# Patient Record
Sex: FEMALE | HISPANIC OR LATINO | Employment: UNEMPLOYED | URBAN - NONMETROPOLITAN AREA
[De-identification: names, ages, dates, MRNs, and addresses within clinical notes are randomized per-mention and may not be internally consistent; named-entity substitution may affect disease eponyms.]

---

## 2023-07-21 ENCOUNTER — APPOINTMENT (EMERGENCY)
Dept: RADIOLOGY | Facility: HOSPITAL | Age: 43
End: 2023-07-21
Payer: COMMERCIAL

## 2023-07-21 ENCOUNTER — APPOINTMENT (EMERGENCY)
Dept: CT IMAGING | Facility: HOSPITAL | Age: 43
End: 2023-07-21
Payer: COMMERCIAL

## 2023-07-21 ENCOUNTER — HOSPITAL ENCOUNTER (OUTPATIENT)
Facility: HOSPITAL | Age: 43
Setting detail: OBSERVATION
Discharge: HOME | End: 2023-07-22
Attending: EMERGENCY MEDICINE | Admitting: INTERNAL MEDICINE
Payer: COMMERCIAL

## 2023-07-21 DIAGNOSIS — R29.810 FACIAL DROOP: Primary | ICD-10-CM

## 2023-07-21 DIAGNOSIS — R07.9 CHEST PAIN: ICD-10-CM

## 2023-07-21 DIAGNOSIS — I10 HYPERTENSION, UNSPECIFIED TYPE: ICD-10-CM

## 2023-07-21 DIAGNOSIS — F41.1 GAD (GENERALIZED ANXIETY DISORDER): ICD-10-CM

## 2023-07-21 LAB
ALBUMIN SERPL BCP-MCNC: 4.3 G/DL (ref 3.5–5)
ALP SERPL-CCNC: 81 U/L (ref 34–104)
ALT SERPL W P-5'-P-CCNC: 16 U/L (ref 7–52)
ANION GAP SERPL CALCULATED.3IONS-SCNC: 9 MMOL/L
APTT PPP: 24 SECONDS (ref 23–37)
AST SERPL W P-5'-P-CCNC: 21 U/L (ref 13–39)
BASOPHILS # BLD AUTO: 0.03 THOUSANDS/ÂΜL (ref 0–0.1)
BASOPHILS NFR BLD AUTO: 0 % (ref 0–1)
BILIRUB SERPL-MCNC: 0.28 MG/DL (ref 0.2–1)
BUN SERPL-MCNC: 14 MG/DL (ref 5–25)
CALCIUM SERPL-MCNC: 9.3 MG/DL (ref 8.4–10.2)
CARDIAC TROPONIN I PNL SERPL HS: 2 NG/L
CHLORIDE SERPL-SCNC: 103 MMOL/L (ref 96–108)
CO2 SERPL-SCNC: 25 MMOL/L (ref 21–32)
CREAT SERPL-MCNC: 0.8 MG/DL (ref 0.6–1.3)
EOSINOPHIL # BLD AUTO: 0.22 THOUSAND/ÂΜL (ref 0–0.61)
EOSINOPHIL NFR BLD AUTO: 3 % (ref 0–6)
ERYTHROCYTE [DISTWIDTH] IN BLOOD BY AUTOMATED COUNT: 13.7 % (ref 11.6–15.1)
GFR SERPL CREATININE-BSD FRML MDRD: 91 ML/MIN/1.73SQ M
GLUCOSE SERPL-MCNC: 85 MG/DL (ref 65–140)
GLUCOSE SERPL-MCNC: 88 MG/DL (ref 65–140)
HCT VFR BLD AUTO: 35.1 % (ref 34.8–46.1)
HGB BLD-MCNC: 11 G/DL (ref 11.5–15.4)
IMM GRANULOCYTES # BLD AUTO: 0.01 THOUSAND/UL (ref 0–0.2)
IMM GRANULOCYTES NFR BLD AUTO: 0 % (ref 0–2)
INR PPP: 0.94 (ref 0.84–1.19)
LYMPHOCYTES # BLD AUTO: 3.05 THOUSANDS/ÂΜL (ref 0.6–4.47)
LYMPHOCYTES NFR BLD AUTO: 36 % (ref 14–44)
MCH RBC QN AUTO: 26.8 PG (ref 26.8–34.3)
MCHC RBC AUTO-ENTMCNC: 31.3 G/DL (ref 31.4–37.4)
MCV RBC AUTO: 85 FL (ref 82–98)
MONOCYTES # BLD AUTO: 0.51 THOUSAND/ÂΜL (ref 0.17–1.22)
MONOCYTES NFR BLD AUTO: 6 % (ref 4–12)
NEUTROPHILS # BLD AUTO: 4.57 THOUSANDS/ÂΜL (ref 1.85–7.62)
NEUTS SEG NFR BLD AUTO: 55 % (ref 43–75)
NRBC BLD AUTO-RTO: 0 /100 WBCS
PLATELET # BLD AUTO: 353 THOUSANDS/UL (ref 149–390)
PMV BLD AUTO: 10.4 FL (ref 8.9–12.7)
POTASSIUM SERPL-SCNC: 3.6 MMOL/L (ref 3.5–5.3)
PROT SERPL-MCNC: 7.7 G/DL (ref 6.4–8.4)
PROTHROMBIN TIME: 12.8 SECONDS (ref 11.6–14.5)
RBC # BLD AUTO: 4.11 MILLION/UL (ref 3.81–5.12)
SODIUM SERPL-SCNC: 137 MMOL/L (ref 135–147)
WBC # BLD AUTO: 8.39 THOUSAND/UL (ref 4.31–10.16)

## 2023-07-21 PROCEDURE — 99285 EMERGENCY DEPT VISIT HI MDM: CPT | Performed by: EMERGENCY MEDICINE

## 2023-07-21 PROCEDURE — 82948 REAGENT STRIP/BLOOD GLUCOSE: CPT

## 2023-07-21 PROCEDURE — 36415 COLL VENOUS BLD VENIPUNCTURE: CPT | Performed by: EMERGENCY MEDICINE

## 2023-07-21 PROCEDURE — 71275 CT ANGIOGRAPHY CHEST: CPT

## 2023-07-21 PROCEDURE — 71045 X-RAY EXAM CHEST 1 VIEW: CPT

## 2023-07-21 PROCEDURE — G1004 CDSM NDSC: HCPCS

## 2023-07-21 PROCEDURE — 0241U HB NFCT DS VIR RESP RNA 4 TRGT: CPT | Performed by: EMERGENCY MEDICINE

## 2023-07-21 PROCEDURE — 80053 COMPREHEN METABOLIC PANEL: CPT | Performed by: EMERGENCY MEDICINE

## 2023-07-21 PROCEDURE — 85730 THROMBOPLASTIN TIME PARTIAL: CPT | Performed by: EMERGENCY MEDICINE

## 2023-07-21 PROCEDURE — 85610 PROTHROMBIN TIME: CPT | Performed by: EMERGENCY MEDICINE

## 2023-07-21 PROCEDURE — 74174 CTA ABD&PLVS W/CONTRAST: CPT

## 2023-07-21 PROCEDURE — 70498 CT ANGIOGRAPHY NECK: CPT

## 2023-07-21 PROCEDURE — 70496 CT ANGIOGRAPHY HEAD: CPT

## 2023-07-21 PROCEDURE — 99285 EMERGENCY DEPT VISIT HI MDM: CPT

## 2023-07-21 PROCEDURE — 84484 ASSAY OF TROPONIN QUANT: CPT | Performed by: EMERGENCY MEDICINE

## 2023-07-21 PROCEDURE — 85025 COMPLETE CBC W/AUTO DIFF WBC: CPT | Performed by: EMERGENCY MEDICINE

## 2023-07-21 RX ORDER — ACETAMINOPHEN 325 MG/1
975 TABLET ORAL ONCE
Status: COMPLETED | OUTPATIENT
Start: 2023-07-22 | End: 2023-07-22

## 2023-07-21 RX ADMIN — IOHEXOL 185 ML: 350 INJECTION, SOLUTION INTRAVENOUS at 23:11

## 2023-07-22 VITALS
TEMPERATURE: 98.4 F | HEART RATE: 81 BPM | OXYGEN SATURATION: 96 % | DIASTOLIC BLOOD PRESSURE: 99 MMHG | WEIGHT: 193.56 LBS | SYSTOLIC BLOOD PRESSURE: 146 MMHG | RESPIRATION RATE: 15 BRPM

## 2023-07-22 PROBLEM — R29.90 STROKE-LIKE SYMPTOMS: Status: ACTIVE | Noted: 2023-07-22

## 2023-07-22 PROBLEM — F41.1 GAD (GENERALIZED ANXIETY DISORDER): Status: ACTIVE | Noted: 2023-07-22

## 2023-07-22 PROBLEM — I10 HTN, GOAL BELOW 140/90: Status: ACTIVE | Noted: 2017-05-08

## 2023-07-22 LAB
2HR DELTA HS TROPONIN: 0 NG/L
4HR DELTA HS TROPONIN: 0 NG/L
ANION GAP SERPL CALCULATED.3IONS-SCNC: 9 MMOL/L
BASOPHILS # BLD AUTO: 0.03 THOUSANDS/ÂΜL (ref 0–0.1)
BASOPHILS NFR BLD AUTO: 0 % (ref 0–1)
BUN SERPL-MCNC: 14 MG/DL (ref 5–25)
CALCIUM SERPL-MCNC: 9.3 MG/DL (ref 8.4–10.2)
CARDIAC TROPONIN I PNL SERPL HS: 2 NG/L
CARDIAC TROPONIN I PNL SERPL HS: 2 NG/L
CHLORIDE SERPL-SCNC: 104 MMOL/L (ref 96–108)
CHOLEST SERPL-MCNC: 112 MG/DL
CO2 SERPL-SCNC: 23 MMOL/L (ref 21–32)
CREAT SERPL-MCNC: 0.7 MG/DL (ref 0.6–1.3)
EOSINOPHIL # BLD AUTO: 0.25 THOUSAND/ÂΜL (ref 0–0.61)
EOSINOPHIL NFR BLD AUTO: 4 % (ref 0–6)
ERYTHROCYTE [DISTWIDTH] IN BLOOD BY AUTOMATED COUNT: 13.8 % (ref 11.6–15.1)
EST. AVERAGE GLUCOSE BLD GHB EST-MCNC: 100 MG/DL
FLUAV RNA RESP QL NAA+PROBE: NEGATIVE
FLUBV RNA RESP QL NAA+PROBE: NEGATIVE
GFR SERPL CREATININE-BSD FRML MDRD: 107 ML/MIN/1.73SQ M
GLUCOSE SERPL-MCNC: 88 MG/DL (ref 65–140)
HBA1C MFR BLD: 5.1 %
HCT VFR BLD AUTO: 34.4 % (ref 34.8–46.1)
HDLC SERPL-MCNC: 36 MG/DL
HGB BLD-MCNC: 10.6 G/DL (ref 11.5–15.4)
IMM GRANULOCYTES # BLD AUTO: 0.02 THOUSAND/UL (ref 0–0.2)
IMM GRANULOCYTES NFR BLD AUTO: 0 % (ref 0–2)
LDLC SERPL CALC-MCNC: 58 MG/DL (ref 0–100)
LYMPHOCYTES # BLD AUTO: 2.67 THOUSANDS/ÂΜL (ref 0.6–4.47)
LYMPHOCYTES NFR BLD AUTO: 39 % (ref 14–44)
MCH RBC QN AUTO: 26.2 PG (ref 26.8–34.3)
MCHC RBC AUTO-ENTMCNC: 30.8 G/DL (ref 31.4–37.4)
MCV RBC AUTO: 85 FL (ref 82–98)
MONOCYTES # BLD AUTO: 0.5 THOUSAND/ÂΜL (ref 0.17–1.22)
MONOCYTES NFR BLD AUTO: 7 % (ref 4–12)
NEUTROPHILS # BLD AUTO: 3.42 THOUSANDS/ÂΜL (ref 1.85–7.62)
NEUTS SEG NFR BLD AUTO: 50 % (ref 43–75)
NRBC BLD AUTO-RTO: 0 /100 WBCS
PLATELET # BLD AUTO: 329 THOUSANDS/UL (ref 149–390)
PMV BLD AUTO: 10.4 FL (ref 8.9–12.7)
POTASSIUM SERPL-SCNC: 3.4 MMOL/L (ref 3.5–5.3)
RBC # BLD AUTO: 4.05 MILLION/UL (ref 3.81–5.12)
RSV RNA RESP QL NAA+PROBE: NEGATIVE
SARS-COV-2 RNA RESP QL NAA+PROBE: NEGATIVE
SODIUM SERPL-SCNC: 136 MMOL/L (ref 135–147)
TRIGL SERPL-MCNC: 90 MG/DL
WBC # BLD AUTO: 6.89 THOUSAND/UL (ref 4.31–10.16)

## 2023-07-22 PROCEDURE — 97162 PT EVAL MOD COMPLEX 30 MIN: CPT

## 2023-07-22 PROCEDURE — 84484 ASSAY OF TROPONIN QUANT: CPT | Performed by: EMERGENCY MEDICINE

## 2023-07-22 PROCEDURE — 99239 HOSP IP/OBS DSCHRG MGMT >30: CPT | Performed by: INTERNAL MEDICINE

## 2023-07-22 PROCEDURE — 97165 OT EVAL LOW COMPLEX 30 MIN: CPT

## 2023-07-22 PROCEDURE — 80048 BASIC METABOLIC PNL TOTAL CA: CPT | Performed by: INTERNAL MEDICINE

## 2023-07-22 PROCEDURE — 36415 COLL VENOUS BLD VENIPUNCTURE: CPT | Performed by: EMERGENCY MEDICINE

## 2023-07-22 PROCEDURE — 80061 LIPID PANEL: CPT | Performed by: INTERNAL MEDICINE

## 2023-07-22 PROCEDURE — 85025 COMPLETE CBC W/AUTO DIFF WBC: CPT | Performed by: INTERNAL MEDICINE

## 2023-07-22 PROCEDURE — 83036 HEMOGLOBIN GLYCOSYLATED A1C: CPT | Performed by: INTERNAL MEDICINE

## 2023-07-22 PROCEDURE — 99223 1ST HOSP IP/OBS HIGH 75: CPT | Performed by: INTERNAL MEDICINE

## 2023-07-22 RX ORDER — CLOPIDOGREL BISULFATE 75 MG/1
300 TABLET ORAL ONCE
Status: COMPLETED | OUTPATIENT
Start: 2023-07-22 | End: 2023-07-22

## 2023-07-22 RX ORDER — POTASSIUM CHLORIDE 20 MEQ/1
40 TABLET, EXTENDED RELEASE ORAL ONCE
Status: COMPLETED | OUTPATIENT
Start: 2023-07-22 | End: 2023-07-22

## 2023-07-22 RX ORDER — HEPARIN SODIUM 5000 [USP'U]/ML
5000 INJECTION, SOLUTION INTRAVENOUS; SUBCUTANEOUS EVERY 8 HOURS SCHEDULED
Status: DISCONTINUED | OUTPATIENT
Start: 2023-07-22 | End: 2023-07-22 | Stop reason: HOSPADM

## 2023-07-22 RX ORDER — BUSPIRONE HYDROCHLORIDE 5 MG/1
5 TABLET ORAL 2 TIMES DAILY
Qty: 60 TABLET | Refills: 0 | Status: SHIPPED | OUTPATIENT
Start: 2023-07-22

## 2023-07-22 RX ORDER — LISINOPRIL 10 MG/1
10 TABLET ORAL DAILY
Refills: 0
Start: 2023-07-22

## 2023-07-22 RX ORDER — ASPIRIN 325 MG
325 TABLET ORAL ONCE
Status: COMPLETED | OUTPATIENT
Start: 2023-07-22 | End: 2023-07-22

## 2023-07-22 RX ORDER — AMLODIPINE BESYLATE 5 MG/1
5 TABLET ORAL DAILY
Status: DISCONTINUED | OUTPATIENT
Start: 2023-07-22 | End: 2023-07-22 | Stop reason: HOSPADM

## 2023-07-22 RX ORDER — ACETAMINOPHEN 325 MG/1
650 TABLET ORAL EVERY 4 HOURS PRN
Status: DISCONTINUED | OUTPATIENT
Start: 2023-07-22 | End: 2023-07-22 | Stop reason: HOSPADM

## 2023-07-22 RX ORDER — ALBUTEROL SULFATE 90 UG/1
2 AEROSOL, METERED RESPIRATORY (INHALATION) EVERY 4 HOURS PRN
Status: DISCONTINUED | OUTPATIENT
Start: 2023-07-22 | End: 2023-07-22 | Stop reason: HOSPADM

## 2023-07-22 RX ORDER — ASPIRIN 81 MG/1
81 TABLET, CHEWABLE ORAL DAILY
Status: DISCONTINUED | OUTPATIENT
Start: 2023-07-22 | End: 2023-07-22 | Stop reason: HOSPADM

## 2023-07-22 RX ORDER — AMLODIPINE BESYLATE 5 MG/1
5 TABLET ORAL DAILY
Status: DISCONTINUED | OUTPATIENT
Start: 2023-07-22 | End: 2023-07-22

## 2023-07-22 RX ORDER — ATORVASTATIN CALCIUM 40 MG/1
40 TABLET, FILM COATED ORAL EVERY EVENING
Status: DISCONTINUED | OUTPATIENT
Start: 2023-07-22 | End: 2023-07-22 | Stop reason: HOSPADM

## 2023-07-22 RX ORDER — NIFEDIPINE 30 MG/1
30 TABLET, EXTENDED RELEASE ORAL DAILY
Refills: 0
Start: 2023-07-22

## 2023-07-22 RX ORDER — ASPIRIN 81 MG/1
81 TABLET, CHEWABLE ORAL DAILY
Qty: 30 TABLET | Refills: 0 | Status: SHIPPED | OUTPATIENT
Start: 2023-07-23

## 2023-07-22 RX ORDER — BUSPIRONE HYDROCHLORIDE 5 MG/1
5 TABLET ORAL 2 TIMES DAILY
Status: DISCONTINUED | OUTPATIENT
Start: 2023-07-22 | End: 2023-07-22 | Stop reason: HOSPADM

## 2023-07-22 RX ADMIN — POTASSIUM CHLORIDE 40 MEQ: 1500 TABLET, EXTENDED RELEASE ORAL at 09:54

## 2023-07-22 RX ADMIN — BUSPIRONE HYDROCHLORIDE 5 MG: 5 TABLET ORAL at 09:54

## 2023-07-22 RX ADMIN — ASPIRIN 81 MG: 81 TABLET, CHEWABLE ORAL at 09:54

## 2023-07-22 RX ADMIN — ACETAMINOPHEN 975 MG: 325 TABLET, FILM COATED ORAL at 00:10

## 2023-07-22 RX ADMIN — HEPARIN SODIUM 5000 UNITS: 5000 INJECTION INTRAVENOUS; SUBCUTANEOUS at 14:52

## 2023-07-22 RX ADMIN — CLOPIDOGREL BISULFATE 300 MG: 75 TABLET ORAL at 00:43

## 2023-07-22 RX ADMIN — ASPIRIN 325 MG ORAL TABLET 325 MG: 325 PILL ORAL at 00:44

## 2023-07-22 RX ADMIN — AMLODIPINE BESYLATE 5 MG: 5 TABLET ORAL at 14:52

## 2023-07-22 RX ADMIN — HEPARIN SODIUM 5000 UNITS: 5000 INJECTION INTRAVENOUS; SUBCUTANEOUS at 09:54

## 2023-07-22 NOTE — DISCHARGE SUMMARY
[address]  Discharge- [facility] 1980, 44 y.o. female MRN: [id number]  Unit/Bed#: 413-01 Encounter: [id number]  Primary Care Provider: No primary care provider on file. Date and time admitted to hospital: 7/21/2023 10:26 PM    * Stroke-like symptoms  Assessment & Plan  Patient with past medical history significant hypertension initially presented with facial droop  Facial droop began approximately 2 hours prior to arrival  Facial droop has since resolved  Neuro exam otherwise normal  CTA without any acute abnormalities    No recurrent symptoms, medically stable for discharge. Resume home meds at discharge for treatment of hypertension, patient did not know her exact dosing of medications. Start aspirin 81 mg daily, can follow-up with PCP to discuss long-term therapy, outpatient neurology follow-up recommended    BREANNA (generalized anxiety disorder)  Assessment & Plan  Continue home BuSpar    HTN (hypertension)  Assessment & Plan  Patient reports compliance with home regimen, has PCP appointment on Wednesday, advised follow-up. Again resume home meds, outpatient blood pressure check      Medical Problems     Resolved Problems  Date Reviewed: 7/22/2023   None       Discharging Physician / Practitioner: Dayo Pena DO  PCP: No primary care provider on file.   Admission Date:   Admission Orders (From admission, onward)     Ordered        07/22/23 0114  Place in Observation  Once                      Discharge Date: 07/22/23    Condition at Discharge: good    Discharge Day Visit / Exam:   Subjective: No headache, no neck pain, reported chest pain earlier today, EKG unremarkable, no chest pain at time of my exam.  Vitals: Blood Pressure: (!) 143/101 (07/22/23 1347)  Pulse: 78 (07/22/23 1347)  Temperature: 98.4 °F (36.9 °C) (07/22/23 1231)  Temp Source: Oral (07/22/23 0919)  Respirations: 15 (07/22/23 1231)  Weight - Scale: 87.8 kg (193 lb 9 oz) (07/21/23 2232)  SpO2: 96 % (07/22/23 1347)  Exam:   Physical Exam  Vitals and nursing note reviewed. Constitutional:       General: She is not in acute distress. Appearance: Normal appearance. She is not ill-appearing, toxic-appearing or diaphoretic. HENT:      Head: Normocephalic. Right Ear: External ear normal.      Left Ear: External ear normal.   Cardiovascular:      Rate and Rhythm: Normal rate. Pulses: Normal pulses. Pulmonary:      Effort: Pulmonary effort is normal.   Musculoskeletal:         General: Normal range of motion. Skin:     General: Skin is warm. Neurological:      General: No focal deficit present. Mental Status: She is alert and oriented to person, place, and time. Mental status is at baseline. Cranial Nerves: No cranial nerve deficit. Sensory: No sensory deficit. Motor: No weakness. Coordination: Coordination normal.      Gait: Gait normal.      Deep Tendon Reflexes: Reflexes normal.   Psychiatric:         Mood and Affect: Mood normal.         Behavior: Behavior normal.         Thought Content: Thought content normal.         Judgment: Judgment normal.          Discussion with Family: Updated  () via phone. Discharge instructions/Information to patient and family:   See after visit summary for information provided to patient and family. Provisions for Follow-Up Care:  See after visit summary for information related to follow-up care and any pertinent home health orders. Disposition:   Home    Planned Readmission: no     Discharge Statement:  I spent 35 minutes discharging the patient. This time was spent on the day of discharge. I had direct contact with the patient on the day of discharge. Greater than 50% of the total time was spent examining patient, answering all patient questions, arranging and discussing plan of care with patient as well as directly providing post-discharge instructions.   Additional time then spent on discharge activities. Discharge Medications:  See after visit summary for reconciled discharge medications provided to patient and/or family.       **Please Note: This note may have been constructed using a voice recognition system**

## 2023-07-22 NOTE — QUICK NOTE
Stroke alert clled: 3113 PM  Neurology response immediate  LKW: approx 2 hours prior  NIHSS 2 for left facial droop, in the setting of left neck pain and chest pain    CT H not acute  CTA H/N with right vert congenital variant likely cannot def rule out dissection/occulusion, intracranially     At this time recommend admission under stroke protocol  No TNK tPA low nihss non disabling deficits  ASA and Plavix load then ASA 81 mg and Plavix 75 mg daily and statin  MRI brain routine  SBP goal 140-180 ideally.  Not higher as cannot def rule out dissection    Daytime neurology consult   D/w ED at time of alert

## 2023-07-22 NOTE — H&P
[address]  H&P  Name: Thomas Mon 44 y.o. female I MRN: [id number]  Unit/Bed#: RM02 I Date of Admission: 7/21/2023   Date of Service: 7/22/2023 I Hospital Day: 0      Assessment/Plan   * Stroke-like symptoms  Assessment & Plan  Patient with past medical history significant hypertension initially presented with facial droop  Facial droop began approximately 2 hours prior to arrival  Facial droop has since resolved  Neuro exam otherwise normal  CTA without any acute abnormalities  Neurology consulted recommended stroke pathway  Check MRI brain  PT/OT  Telemetry monitoring  Neurochecks  Follow-up formal neurology consult  Allow permissive hypertension for now    BREANNA (generalized anxiety disorder)  Assessment & Plan  Continue home BuSpar    HTN (hypertension)  Assessment & Plan  Allow permissive hypertension for now         VTE Prophylaxis: Heparin  / sequential compression device   Code Status: full code  POLST: There is no POLST form on file for this patient (pre-hospital)  Discussion with family: pt    Anticipated Length of Stay:  Patient will be admitted on an Observation basis with an anticipated length of stay of  < 2 midnights. Justification for Hospital Stay: Strokelike symptoms    Total Time for Visit, including Counseling / Coordination of Care: 60 minutes. Greater than 50% of this total time spent on direct patient counseling and coordination of care. Chief Complaint:   Facial droop    History of Present Illness:    Thomas Mon is a 44 y.o. female with past medical history significant hypertension initially presented with left facial droop. Reports began approximate 2 hours prior to arrival.  Has since resolved. Denies any other acute complaints. Denies numbness, weakness, slurred speech, confusion, headaches or any other complaints.   Review of Systems:    Review of Systems   Constitutional: Negative for activity change, appetite change, chills, diaphoresis, fever and unexpected weight change. HENT: Negative for congestion, facial swelling and rhinorrhea. Eyes: Negative for photophobia and visual disturbance. Respiratory: Negative for cough, shortness of breath and wheezing. Cardiovascular: Negative for chest pain and palpitations. Gastrointestinal: Negative for abdominal pain, blood in stool, constipation, diarrhea, nausea and vomiting. Genitourinary: Negative for decreased urine volume, difficulty urinating, dysuria, flank pain, frequency, hematuria and urgency. Musculoskeletal: Negative for arthralgias, back pain, joint swelling and myalgias. Neurological: Positive for facial asymmetry. Negative for dizziness, syncope, light-headedness, numbness and headaches. Psychiatric/Behavioral: Negative for confusion and decreased concentration. The patient is not nervous/anxious. Past Medical and Surgical History:     No past medical history on file. No past surgical history on file. Meds/Allergies:    Prior to Admission medications    Not on File     I have reviewed home medications with patient personally. Allergies: No Known Allergies    Social History:     Marital Status: /Civil Union     Patient Pre-hospital Living Situation: home  Patient Pre-hospital Level of Mobility: independent  Patient Pre-hospital Diet Restrictions: none  Substance Use History:   Social History     Substance and Sexual Activity   Alcohol Use Not on file     Social History     Tobacco Use   Smoking Status Not on file   Smokeless Tobacco Not on file     Social History     Substance and Sexual Activity   Drug Use Not on file       Family History:    No family history on file.     Physical Exam:     Vitals:   Blood Pressure: 146/81 (07/21/23 2330)  Pulse: 90 (07/21/23 2345)  Temperature: 98.1 °F (36.7 °C) (07/21/23 2232)  Temp Source: Temporal (07/21/23 2232)  Respirations: 19 (07/21/23 2345)  Weight - Scale: 87.8 kg (193 lb 9 oz) (07/21/23 2232)  SpO2: 96 % (07/21/23 2345)    Physical Exam  Constitutional:       General: She is not in acute distress. Appearance: She is well-developed. She is not diaphoretic. HENT:      Head: Normocephalic and atraumatic. Nose: Nose normal.      Mouth/Throat:      Pharynx: No oropharyngeal exudate. Eyes:      General: No scleral icterus. Right eye: No discharge. Left eye: No discharge. Conjunctiva/sclera: Conjunctivae normal.      Pupils: Pupils are equal, round, and reactive to light. Neck:      Thyroid: No thyromegaly. Vascular: No JVD. Cardiovascular:      Rate and Rhythm: Normal rate and regular rhythm. Heart sounds: Normal heart sounds. No murmur heard. No friction rub. No gallop. Pulmonary:      Effort: Pulmonary effort is normal. No respiratory distress. Breath sounds: Normal breath sounds. No wheezing or rales. Chest:      Chest wall: No tenderness. Abdominal:      General: Bowel sounds are normal. There is no distension. Palpations: Abdomen is soft. Tenderness: There is no abdominal tenderness. There is no guarding or rebound. Musculoskeletal:         General: No tenderness or deformity. Normal range of motion. Cervical back: Normal range of motion and neck supple. Skin:     General: Skin is warm and dry. Findings: No erythema or rash. Neurological:      Mental Status: She is alert. Cranial Nerves: No cranial nerve deficit. Sensory: No sensory deficit. Motor: No abnormal muscle tone. Coordination: Coordination normal.         ()    Additional Data:     Lab Results: I have personally reviewed pertinent reports.       Results from last 7 days   Lab Units 07/21/23  2239   WBC Thousand/uL 8.39   HEMOGLOBIN g/dL 11.0*   HEMATOCRIT % 35.1   PLATELETS Thousands/uL 353   NEUTROS PCT % 55   LYMPHS PCT % 36   MONOS PCT % 6   EOS PCT % 3     Results from last 7 days   Lab Units 07/21/23  2239   SODIUM mmol/L 137 POTASSIUM mmol/L 3.6   CHLORIDE mmol/L 103   CO2 mmol/L 25   BUN mg/dL 14   CREATININE mg/dL 0.80   ANION GAP mmol/L 9   CALCIUM mg/dL 9.3   ALBUMIN g/dL 4.3   TOTAL BILIRUBIN mg/dL 0.28   ALK PHOS U/L 81   ALT U/L 16   AST U/L 21   GLUCOSE RANDOM mg/dL 88     Results from last 7 days   Lab Units 07/21/23  2239   INR  0.94     Results from last 7 days   Lab Units 07/21/23  2233   POC GLUCOSE mg/dl 85               Imaging: I have personally reviewed pertinent reports. CT stroke alert brain   Final Result by Jordon Augustin MD (07/21 2304)      No acute intracranial abnormality. I personally discussed this study with Dr. Deon Dos Santos on 7/21/2023 11:03 PM.            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 Otherwise, no evidence of flow-limiting disease. I personally discussed this study with Dr. Deon Dos Santos on 7/21/2023 11:10 PM.                           Workstation performed: [id number]         CTA dissection protocol chest/abdomen/pelvis   Final Result by Jacey Wood MD (07/22 0032)      Residual contrast in the vasculature limits sensitivity of the non contrast portion of the exam for evaluation of the intramural hematoma. There is no aortic dissection. There is no aortic aneurysm. Workstation performed: [id number]         XR chest 1 view portable    (Results Pending)       EKG, Pathology, and Other Studies Reviewed on Admission:   · EKG: reviewed    Allscripts / Epic Records Reviewed: Yes     ** Please Note: This note has been constructed using a voice recognition system.  **

## 2023-07-22 NOTE — NURSING NOTE
Pt discharged to home. D/c instructions given and reviewed with pt by Isaac Dupont RN. Pt left floor ambulatory.

## 2023-07-22 NOTE — PHYSICAL THERAPY NOTE
Physical Therapy Evaluation    Patient Name: Giacomo SPICER Date: 7/22/2023     Problem List  Principal Problem:    Stroke-like symptoms  Active Problems:    HTN (hypertension)    BREANNA (generalized anxiety disorder)       Past Medical History  History reviewed. No pertinent past medical history. Past Surgical History  History reviewed. No pertinent surgical history. 07/22/23 0830   PT Last Visit   PT Visit Date 07/22/23   Note Type   Note type Evaluation   Pain Assessment   Pain Assessment Tool 0-10   Pain Score No Pain   Restrictions/Precautions   Weight Bearing Precautions Per Order No   Home Living   Type of [address] [place] [address]  One level  (0 EDILBERTO)   Bathroom Shower/Tub Tub/shower unit   Bathroom Toilet Standard   Bathroom Accessibility Accessible   Additional Comments pt denies use of DME at baseline   Prior Function   Level of [place] Independent with ADLs; Independent with functional mobility; Independent with IADLS   Lives With Spouse; Family   Receives Help From Family   IADLs Family/Friend/Other provides transportation   Falls in the last 6 months 0   General   Family/Caregiver Present No   Cognition   Overall Cognitive Status WFL   Arousal/Participation Alert   Orientation Level Oriented X4   Following Commands Follows all commands and directions without difficulty   Subjective   Subjective pt pleasant and cooperative   RLE Assessment   RLE Assessment WFL   LLE Assessment   LLE Assessment WFL   Bed Mobility   Supine to Sit 7  Independent   Sit to Supine 7  Independent   Transfers   Sit to Stand 7  Independent   Stand to Sit 7  Independent   Additional Comments no device used   Ambulation/Elevation   Gait pattern WNL   Gait Assistance 7  Independent   Assistive Device None   Distance 250'   Balance   Static Sitting Good   Dynamic Sitting Good   Static Standing Good   Dynamic Standing Good   Ambulatory Good Endurance Deficit   Endurance Deficit No   Activity Tolerance   Activity Tolerance Patient tolerated treatment well   Assessment   Prognosis Good   Assessment Patient is a 44 y.o. female evaluated by Physical Therapy s/p admit to [address] [place] [address] on 7/21/2023 with admitting diagnosis of: Chest pain, Facial droop, and principal problem of: Stroke-like symptoms. PT was consulted to assess patient's functional mobility and discharge needs. Ordered are PT Evaluation and treatment with activity level of: up and OOB as tolerated. Comorbidities affecting patient's physical performance at time of assessment include: HTN. Personal factors affecting the patient at time of IE include: language barrier ([place]i-speaking only) and anxiety. Please locate objective findings from PT assessment regarding body systems outlined above. Upon evaluation, pt able to perform all functional mobility independently without assistive device. Pt demonstrating WFL strength, balance, endurance, and coordination; able to ambulate 250' without difficulty. Continued PT intervention not indicated at this time; will d/c PT orders. The patient's AM-PAC Basic Mobility Inpatient Short Form Raw Score is 24. A Raw score of greater than 16 suggests the patient may benefit from discharge to home. Please also refer to the recommendation of the Physical Therapist for safe discharge planning. Co treatment with OT secondary to complex medical condition of pt, possible A of 2 required to achieve and maintain transitional movements, requiring the need of skilled therapeutic intervention of 2 therapists to achieve delivery of services. Translation services via iPad utilized during session.    Goals   Patient Goals to go home   Recommendation   PT Discharge Recommendation No rehabilitation needs   AM-PAC Basic Mobility Inpatient   Turning in Flat Bed Without Bedrails 4   Lying on Back to Sitting on Edge of Flat Bed Without Bedrails 4   Moving Bed to Chair 4 Standing Up From Chair Using Arms 4   Walk in Room 4   Climb 3-5 Stairs With Railing 4   Basic Mobility Inpatient Raw Score 24   Basic Mobility Standardized Score 57.68   Highest Level Of Mobility   -HLM Goal 8: Walk 250 feet or more   JH-HLM Achieved 8: Walk 250 feet ot more   End of Consult   Patient Position at End of Consult Other (comment)  (pt ambulating independently into bathroom)

## 2023-07-22 NOTE — QUICK NOTE
Patient seen and examined, currently no symptoms, did report to nursing staff that she had some left-sided chest pain rated 5 out of 10, now resolved, EKG personally reviewed, no acute ischemia. Cardiac enzymes x3 negative, left-sided neck pain resolved, left-sided facial droop resolved, no other focal neurologic deficits. No motor or sensory deficits reported. Outpatient PCP notes reviewed, patient does have history of GERD, generalized anxiety disorder. These are likely contributing to her current symptoms. Patient has no family history of any early cardiovascular disease in her family    Patient's grandmother lived to be 80, patient's grandmother was 80 something.

## 2023-07-22 NOTE — CASE MANAGEMENT
Case Management Assessment    Patient name Talita Orta  Location /656-33 MRN [id number]  : 1980 Date 2023       Current Admission Date: 2023  Current Admission Diagnosis:Stroke-like symptoms   Patient Active Problem List    Diagnosis Date Noted   • BREANNA (generalized anxiety disorder) 2023   • Stroke-like symptoms 2023   • HTN (hypertension) 2017      LOS (days): 0  Geometric Mean LOS (GMLOS) (days):   Days to GMLOS:     OBJECTIVE:              Current admission status: Observation       Preferred Pharmacy: No Pharmacies Listed  Primary Care Provider: No primary care provider on file. Primary Insurance: [address] [place] [address]  Secondary Insurance:     ASSESSMENT:  Active Health Care Proxies    There are no active Health Care Proxies on file. Spoke with pt and pt family at bedside with  [phone] via [address] Therapy seen pt this am and no needs noted. Pt does not have PCP listed in system but does states she follows with a Andreia in [address] [place] [address]. She states she has appt with him this upcoming Wednesday. No other discharge needs noted.

## 2023-07-22 NOTE — ASSESSMENT & PLAN NOTE
Patient with past medical history significant hypertension initially presented with facial droop  Facial droop began approximately 2 hours prior to arrival  Facial droop has since resolved  Neuro exam otherwise normal  CTA without any acute abnormalities    No recurrent symptoms, medically stable for discharge. Resume home meds at discharge for treatment of hypertension, patient did not know her exact dosing of medications.       Start aspirin 81 mg daily, can follow-up with PCP to discuss long-term therapy, outpatient neurology follow-up recommended

## 2023-07-22 NOTE — ED PROVIDER NOTES
History  Chief Complaint   Patient presents with   • Chest Pain     Pt originally checked in as sore throat then started complaining of CP, while transferring patient pt had L facial droop     42-year-old female who presents for sore throat, neck pain, chest pain, left facial droop. Patient describes that over the past hour or 2, she  walked into the house complaining of severe chest pain. States that she is also having some left neck pain and a mild headache. She describes the headache as sudden onset however that the headache is mild. She states that  she is also having pain in the left side of her neck. Intermittently, she was complaining of a left facial droop, and stated that it felt difficult for her to talk. However, on my examination although she presented with a facial droop, she was speaking clearly although this is limited by language barrier, as well as assistance from . Patient was able to discuss symptoms with translation. ROS otherwise negative. None       History reviewed. No pertinent past medical history. History reviewed. No pertinent surgical history. History reviewed. No pertinent family history. I have reviewed and agree with the history as documented. E-Cigarette/Vaping     E-Cigarette/Vaping Substances          Review of Systems   Constitutional: Negative for chills and fever. HENT: Negative for congestion, rhinorrhea and sore throat. Respiratory: Negative for cough and shortness of breath. Cardiovascular: Positive for chest pain. Negative for palpitations. Gastrointestinal: Negative for abdominal pain, constipation, diarrhea, nausea and vomiting. Genitourinary: Negative for difficulty urinating and flank pain. Musculoskeletal: Positive for neck pain. Negative for arthralgias. Neurological: Positive for facial asymmetry and speech difficulty. Negative for dizziness, weakness, light-headedness and headaches.    Psychiatric/Behavioral: Negative for agitation, behavioral problems and confusion. All other systems reviewed and are negative. Physical Exam  Physical Exam  Constitutional:       Appearance: She is well-developed. HENT:      Head: Normocephalic and atraumatic. Cardiovascular:      Rate and Rhythm: Normal rate and regular rhythm. Heart sounds: Normal heart sounds. No murmur heard. No friction rub. Pulmonary:      Effort: Pulmonary effort is normal. No respiratory distress. Breath sounds: Normal breath sounds. No decreased breath sounds, wheezing, rhonchi or rales. Abdominal:      General: Bowel sounds are normal. There is no distension. Palpations: Abdomen is soft. Tenderness: There is no abdominal tenderness. Musculoskeletal:         General: Normal range of motion. Cervical back: Normal range of motion and neck supple. Skin:     General: Skin is warm. Neurological:      Mental Status: She is alert and oriented to person, place, and time. Coordination: Coordination normal.      Comments: NIHSS: 0 - 2. Patient intermittently had a left-sided facial droop. Although language barrier makes assessment more difficult, she does not have a focal neurologic exam. No weakness in extremities, normal sensation. Can tell me name and age without difficulty. Psychiatric:         Behavior: Behavior normal.         Thought Content:  Thought content normal.         Judgment: Judgment normal.         Vital Signs  ED Triage Vitals [07/21/23 2232]   Temperature Pulse Respirations Blood Pressure SpO2   98.1 °F (36.7 °C) 88 18 (!) 165/108 98 %      Temp Source Heart Rate Source Patient Position - Orthostatic VS BP Location FiO2 (%)   Temporal Monitor Lying Left arm --      Pain Score       10 - Worst Possible Pain           Vitals:    07/22/23 0030 07/22/23 0100 07/22/23 0126 07/22/23 0151   BP: 146/93 138/85  138/75   Pulse: 80 78 78 88   Patient Position - Orthostatic VS: Lying Lying  Lying         Visual Acuity  Visual Acuity    Flowsheet Row Most Recent Value   L Pupil Size (mm) 3   R Pupil Size (mm) 3          ED Medications  Medications   atorvastatin (LIPITOR) tablet 40 mg (has no administration in time range)   aspirin chewable tablet 81 mg (has no administration in time range)   heparin (porcine) subcutaneous injection 5,000 Units (has no administration in time range)   acetaminophen (TYLENOL) tablet 650 mg (has no administration in time range)   busPIRone (BUSPAR) tablet 5 mg (has no administration in time range)   albuterol (PROVENTIL HFA,VENTOLIN HFA) inhaler 2 puff (has no administration in time range)   iohexol (OMNIPAQUE) 350 MG/ML injection (SINGLE-DOSE) 185 mL (185 mL Intravenous Given 7/21/23 2311)   acetaminophen (TYLENOL) tablet 975 mg (975 mg Oral Given 7/22/23 0010)   aspirin tablet 325 mg (325 mg Oral Given 7/22/23 0044)   clopidogrel (PLAVIX) tablet 300 mg (300 mg Oral Given 7/22/23 0043)       Diagnostic Studies  Results Reviewed     Procedure Component Value Units Date/Time    Lipid Panel with Direct LDL reflex [[id number]]     Lab Status: No result Specimen: Blood     Hemoglobin A1c w/EAG Estimation [[id number]]     Lab Status: No result Specimen: Blood     Basic metabolic panel [[id number]]     Lab Status: No result Specimen: Blood     CBC and differential [[id number]]     Lab Status: No result Specimen: Blood     HS Troponin I 4hr [[id number]]  (Normal) Collected: 07/22/23 0226    Lab Status: Final result Specimen: Blood from Arm, Left Updated: 07/22/23 0255     hs TnI 4hr 2 ng/L      Delta 4hr hsTnI 0 ng/L     HS Troponin I 2hr [[id number]]  (Normal) Collected: 07/22/23 0045    Lab Status: Final result Specimen: Blood from Arm, Right Updated: 07/22/23 0113     hs TnI 2hr 2 ng/L      Delta 2hr hsTnI 0 ng/L     FLU/RSV/COVID - if FLU/RSV clinically relevant [[id number]]  (Normal) Collected: 07/21/23 2326    Lab Status: Final result Specimen: Nares from Nose Updated: 07/22/23 0025     SARS-CoV-2 Negative     INFLUENZA A PCR Negative     INFLUENZA B PCR Negative     RSV PCR Negative    Narrative:      FOR PEDIATRIC PATIENTS - copy/paste COVID Guidelines URL to browser: [email or web address]    SARS-CoV-2 assay is a Nucleic Acid Amplification assay intended for the  qualitative detection of nucleic acid from SARS-CoV-2 in nasopharyngeal  swabs. Results are for the presumptive identification of SARS-CoV-2 RNA. Positive results are indicative of infection with SARS-CoV-2, the virus  causing COVID-19, but do not rule out bacterial infection or co-infection  with other viruses. Laboratories within the [facility] and its  territories are required to report all positive results to the appropriate  public health authorities. Negative results do not preclude SARS-CoV-2  infection and should not be used as the sole basis for treatment or other  patient management decisions. Negative results must be combined with  clinical observations, patient history, and epidemiological information. This test has not been FDA cleared or approved. This test has been authorized by FDA under an Emergency Use Authorization  (EUA). This test is only authorized for the duration of time the  declaration that circumstances exist justifying the authorization of the  emergency use of an in vitro diagnostic tests for detection of SARS-CoV-2  virus and/or diagnosis of COVID-19 infection under section 564(b)(1) of  the Act, 21 U. S.C. 318SLR-4(K)(1), unless the authorization is terminated  or revoked sooner. The test has been validated but independent review by FDA  and CLIA is pending. Test performed using [employer] GeneXpert: This RT-PCR assay targets N2,  a region unique to SARS-CoV-2. A conserved region in the E-gene was chosen  for pan-Sarbecovirus detection which includes SARS-CoV-2.     According to CMS-2020-01-R, this platform meets the definition of high-throughput technology.     HS Troponin 0hr (reflex protocol) [[id number]]  (Normal) Collected: 07/21/23 2239    Lab Status: Final result Specimen: Blood from Arm, Right Updated: 07/21/23 2307     hs TnI 0hr 2 ng/L     Comprehensive metabolic panel [[id number]] Collected: 07/21/23 2239    Lab Status: Final result Specimen: Blood from Arm, Right Updated: 07/21/23 2301     Sodium 137 mmol/L      Potassium 3.6 mmol/L      Chloride 103 mmol/L      CO2 25 mmol/L      ANION GAP 9 mmol/L      BUN 14 mg/dL      Creatinine 0.80 mg/dL      Glucose 88 mg/dL      Calcium 9.3 mg/dL      AST 21 U/L      ALT 16 U/L      Alkaline Phosphatase 81 U/L      Total Protein 7.7 g/dL      Albumin 4.3 g/dL      Total Bilirubin 0.28 mg/dL      eGFR 91 ml/min/1.73sq m     Narrative:      [facility] guidelines for Chronic Kidney Disease (CKD):   •  Stage 1 with normal or high GFR (GFR > 90 mL/min/1.73 square meters)  •  Stage 2 Mild CKD (GFR = 60-89 mL/min/1.73 square meters)  •  Stage 3A Moderate CKD (GFR = 45-59 mL/min/1.73 square meters)  •  Stage 3B Moderate CKD (GFR = 30-44 mL/min/1.73 square meters)  •  Stage 4 Severe CKD (GFR = 15-29 mL/min/1.73 square meters)  •  Stage 5 End Stage CKD (GFR <15 mL/min/1.73 square meters)  Note: GFR calculation is accurate only with a steady state creatinine    Protime-INR [[id number]]  (Normal) Collected: 07/21/23 2239    Lab Status: Final result Specimen: Blood from Arm, Right Updated: 07/21/23 2255     Protime 12.8 seconds      INR 0.94    APTT [[id number]]  (Normal) Collected: 07/21/23 2239    Lab Status: Final result Specimen: Blood from Arm, Right Updated: 07/21/23 2255     PTT 24 seconds     CBC and differential [[id number]]  (Abnormal) Collected: 07/21/23 2239    Lab Status: Final result Specimen: Blood from Arm, Right Updated: 07/21/23 2245     WBC 8.39 Thousand/uL      RBC 4.11 Million/uL      Hemoglobin 11.0 g/dL      Hematocrit 35.1 %      MCV 85 fL      MCH 26.8 pg      MCHC 31.3 g/dL RDW 13.7 %      MPV 10.4 fL      Platelets 130 Thousands/uL      nRBC 0 /100 WBCs      Neutrophils Relative 55 %      Immat GRANS % 0 %      Lymphocytes Relative 36 %      Monocytes Relative 6 %      Eosinophils Relative 3 %      Basophils Relative 0 %      Neutrophils Absolute 4.57 Thousands/µL      Immature Grans Absolute 0.01 Thousand/uL      Lymphocytes Absolute 3.05 Thousands/µL      Monocytes Absolute 0.51 Thousand/µL      Eosinophils Absolute 0.22 Thousand/µL      Basophils Absolute 0.03 Thousands/µL     Fingerstick Glucose (POCT) [[id number]]  (Normal) Collected: 07/21/23 2233    Lab Status: Final result Updated: 07/21/23 2238     POC Glucose 85 mg/dl                  CT stroke alert brain   Final Result by Roro Zayas MD (07/21 2304)      No acute intracranial abnormality. I personally discussed this study with Dr. Brittany Amezquita on 7/21/2023 11:03 PM.            Workstation performed: [id number]         CTA stroke alert (head/neck)   Final Result by Roro Zayas MD (07/21 2317)      Absent right intracranial vertebral artery which turns into the posterior inferior cerebellar artery likely developmental rather than occlusion. Otherwise, no evidence of flow-limiting disease. I personally discussed this study with Dr. Brittany Amezquita on 7/21/2023 11:10 PM.                           Workstation performed: [id number]         CTA dissection protocol chest/abdomen/pelvis   Final Result by Mary Coats MD (07/22 0032)      Residual contrast in the vasculature limits sensitivity of the non contrast portion of the exam for evaluation of the intramural hematoma. There is no aortic dissection. There is no aortic aneurysm.       Workstation performed: [id number]         XR chest 1 view portable    (Results Pending)   MRI Inpatient Order    (Results Pending)              Procedures  Procedures         ED Course             HEART Risk Score    Flowsheet Row Most Recent Value   Heart Score Risk Calculator History 0 Filed at: 07/22/2023 0113   ECG 0 Filed at: 07/22/2023 0113   Age 0 Filed at: 07/22/2023 0113   Risk Factors 1 Filed at: 07/22/2023 0113   Troponin 0 Filed at: 07/22/2023 0113   HEART Score 1 Filed at: 07/22/2023 0113           Stroke Assessment     Row Name 07/22/23 0114             NIH Stroke Scale    Interval Baseline      Level of Consciousness (1a.) 0      LOC Questions (1b.) 0      LOC Commands (1c.) 0      Best Gaze (2.) 0      Visual (3.) 0      Facial Palsy (4.) 2      Motor Arm, Left (5a.) 0      Motor Arm, Right (5b.) 0      Motor Leg, Left (6a.) 0      Motor Leg, Right (6b.) 0      Limb Ataxia (7.) 0      Sensory (8.) 0      Best Language (9.) 0      Dysarthria (10.) 0      Extinction and Inattention (11.) (Formerly Neglect) 0      Total 2              Flowsheet Row Most Recent Value   Thrombolytic Decision Options    Thrombolytic Decision Patient not a candidate. Patient is not a candidate options Symptoms resolved/clearly non disabling. SBIRT 22yo+    Flowsheet Row Most Recent Value   Initial Alcohol Screen: US AUDIT-C     1. How often do you have a drink containing alcohol? 0 Filed at: 07/21/2023 2236   2. How many drinks containing alcohol do you have on a typical day you are drinking? 0 Filed at: 07/21/2023 2236   3b. FEMALE Any Age, or MALE 65+: How often do you have 4 or more drinks on one occassion? 0 Filed at: 07/21/2023 2236   Audit-C Score 0 Filed at: 07/21/2023 2236   KAILA: How many times in the past year have you. .. Used an illegal drug or used a prescription medication for non-medical reasons? Never Filed at: 07/21/2023 2236                    Medical Decision Making  I reviewed the patient's medical chart, PMHx, prior encounters, medications. My independent interpretation of CXR demonstrated: No acute cardiopulmonary disease.     My DDx includes: Right-sided stroke, vertebral artery, aortic dissection, ACS    Stroke alert called given active facial droop, although this is intermittent. CT head neck ordered, as well as dissection study given concern for possible aortic dissection given chest pain and neuro symptoms. Scans and labs were unremarkable for acute pathology. Discussed with neurology who recommended aspirin/plavix load. I did discuss with them we do not have MRI available on weekend, they are okay with patient potentially waiting until Monday for MRI. Admitted to [address] Amount and/or Complexity of Data Reviewed  Labs: ordered. Radiology: ordered. Risk  OTC drugs. Prescription drug management. Decision regarding hospitalization. Disposition  Final diagnoses:   Facial droop   Chest pain     Time reflects when diagnosis was documented in both MDM as applicable and the Disposition within this note     Time User Action Codes Description Comment    7/21/2023 10:43 PM Rafaela Cipro Add [R29.810] Facial droop     7/22/2023  1:13 AM Rafaela Cipro Add [R07.9] Chest pain       ED Disposition     ED Disposition   Admit    Condition   Stable    Date/Time   Sat Jul 22, 2023 12:14 AM    Comment   Case was discussed with [facility]RE and the patient's admission status was agreed to be Admission Status: observation status to the service of Dr. Tena Bragg . Follow-up Information    None         There are no discharge medications for this patient. No discharge procedures on file.     PDMP Review     None          ED Provider  Electronically Signed by           Evelio Pierce MD  07/22/23 9894

## 2023-07-22 NOTE — PLAN OF CARE
Problem: PAIN - ADULT  Goal: Verbalizes/displays adequate comfort level or baseline comfort level  Description: Interventions:  - Encourage patient to monitor pain and request assistance  - Assess pain using appropriate pain scale  - Administer analgesics based on type and severity of pain and evaluate response  - Implement non-pharmacological measures as appropriate and evaluate response  - Consider cultural and social influences on pain and pain management  - Notify physician/advanced practitioner if interventions unsuccessful or patient reports new pain  Outcome: Progressing     Problem: SAFETY ADULT  Goal: Patient will remain free of falls  Description: INTERVENTIONS:  - Educate patient/family on patient safety including physical limitations  - Instruct patient to call for assistance with activity   - Consult OT/PT to assist with strengthening/mobility   - Keep Call bell within reach  - Keep bed low and locked with side rails adjusted as appropriate  - Keep care items and personal belongings within reach  - Initiate and maintain comfort rounds  - Make Fall Risk Sign visible to staff  - Offer Toileting every 2 Hours, in advance of need  - Initiate/Maintain bed/chair alarm  - Obtain necessary fall risk management equipment: non skid socks  - Apply yellow socks and bracelet for high fall risk patients  - Consider moving patient to room near nurses station  Outcome: Progressing  Goal: Maintain or return to baseline ADL function  Description: INTERVENTIONS:  -  Assess patient's ability to carry out ADLs; assess patient's baseline for ADL function and identify physical deficits which impact ability to perform ADLs (bathing, care of mouth/teeth, toileting, grooming, dressing, etc.)  - Assess/evaluate cause of self-care deficits   - Assess range of motion  - Assess patient's mobility; develop plan if impaired  - Assess patient's need for assistive devices and provide as appropriate  - Encourage maximum independence but intervene and supervise when necessary  - Involve family in performance of ADLs  - Assess for home care needs following discharge   - Consider OT consult to assist with ADL evaluation and planning for discharge  - Provide patient education as appropriate  Outcome: Progressing  Goal: Maintains/Returns to pre admission functional level  Description: INTERVENTIONS:  - Perform BMAT or MOVE assessment daily.   - Set and communicate daily mobility goal to care team and patient/family/caregiver. - Collaborate with rehabilitation services on mobility goals if consulted  - Perform Range of Motion 3 times a day. - Reposition patient every 3 hours.   - Dangle patient 3 times a day  - Stand patient 3 times a day  - Ambulate patient 3 times a day  - Out of bed to chair 3 times a day   - Out of bed for meals 3 times a day  - Out of bed for toileting  - Record patient progress and toleration of activity level   Outcome: Progressing     Problem: DISCHARGE PLANNING  Goal: Discharge to home or other facility with appropriate resources  Description: INTERVENTIONS:  - Identify barriers to discharge w/patient and caregiver  - Arrange for needed discharge resources and transportation as appropriate  - Identify discharge learning needs (meds, wound care, etc.)  - Arrange for interpretive services to assist at discharge as needed  - Refer to Case Management Department for coordinating discharge planning if the patient needs post-hospital services based on physician/advanced practitioner order or complex needs related to functional status, cognitive ability, or social support system  Outcome: Progressing     Problem: NEUROSENSORY - ADULT  Goal: Achieves stable or improved neurological status  Description: INTERVENTIONS  - Monitor and report changes in neurological status  - Monitor vital signs such as temperature, blood pressure, glucose, and any other labs ordered   - Initiate measures to prevent increased intracranial pressure  - Monitor for seizure activity and implement precautions if appropriate      Outcome: Progressing     Problem: METABOLIC, FLUID AND ELECTROLYTES - ADULT  Goal: Electrolytes maintained within normal limits  Description: INTERVENTIONS:  - Monitor labs and assess patient for signs and symptoms of electrolyte imbalances  - Administer electrolyte replacement as ordered  - Monitor response to electrolyte replacements, including repeat lab results as appropriate  - Instruct patient on fluid and nutrition as appropriate  Outcome: Progressing  Goal: Fluid balance maintained  Description: INTERVENTIONS:  - Monitor labs   - Monitor I/O and WT  - Instruct patient on fluid and nutrition as appropriate  - Assess for signs & symptoms of volume excess or deficit  Outcome: Progressing

## 2023-07-22 NOTE — ED NOTES
While provider was examining the patient the facial droop resolved. Patient was only complaint was CP and neck pain.        Olga Bradshaw RN  07/22/23 [id number]

## 2023-07-22 NOTE — PLAN OF CARE
Problem: PAIN - ADULT  Goal: Verbalizes/displays adequate comfort level or baseline comfort level  Description: Interventions:  - Encourage patient to monitor pain and request assistance  - Assess pain using appropriate pain scale  - Administer analgesics based on type and severity of pain and evaluate response  - Implement non-pharmacological measures as appropriate and evaluate response  - Consider cultural and social influences on pain and pain management  - Notify physician/advanced practitioner if interventions unsuccessful or patient reports new pain  7/22/2023 1142 by Susana Singh RN  Outcome: Progressing  7/22/2023 1141 by Susana Singh RN  Outcome: Progressing     Problem: SAFETY ADULT  Goal: Patient will remain free of falls  Description: INTERVENTIONS:  - Educate patient/family on patient safety including physical limitations  - Instruct patient to call for assistance with activity   - Consult OT/PT to assist with strengthening/mobility   - Keep Call bell within reach  - Keep bed low and locked with side rails adjusted as appropriate  - Keep care items and personal belongings within reach  - Initiate and maintain comfort rounds  - Make Fall Risk Sign visible to staff  - Offer Toileting every 2 Hours, in advance of need  - Initiate/Maintain bed/chair alarm  - Obtain necessary fall risk management equipment: non skid socks  - Apply yellow socks and bracelet for high fall risk patients  - Consider moving patient to room near nurses station  7/22/2023 1142 by Susana Singh RN  Outcome: Progressing  7/22/2023 1141 by Susana Singh RN  Outcome: Progressing  Goal: Maintain or return to baseline ADL function  Description: INTERVENTIONS:  -  Assess patient's ability to carry out ADLs; assess patient's baseline for ADL function and identify physical deficits which impact ability to perform ADLs (bathing, care of mouth/teeth, toileting, grooming, dressing, etc.)  - Assess/evaluate cause of self-care deficits   - Assess range of motion  - Assess patient's mobility; develop plan if impaired  - Assess patient's need for assistive devices and provide as appropriate  - Encourage maximum independence but intervene and supervise when necessary  - Involve family in performance of ADLs  - Assess for home care needs following discharge   - Consider OT consult to assist with ADL evaluation and planning for discharge  - Provide patient education as appropriate  7/22/2023 1142 by Roberto Le RN  Outcome: Progressing  7/22/2023 1141 by Roberto Le RN  Outcome: Progressing  Goal: Maintains/Returns to pre admission functional level  Description: INTERVENTIONS:  - Perform BMAT or MOVE assessment daily.   - Set and communicate daily mobility goal to care team and patient/family/caregiver. - Collaborate with rehabilitation services on mobility goals if consulted  - Perform Range of Motion 3 times a day. - Reposition patient every 3 hours.   - Dangle patient 3 times a day  - Stand patient 3 times a day  - Ambulate patient 3 times a day  - Out of bed to chair 3 times a day   - Out of bed for meals 3 times a day  - Out of bed for toileting  - Record patient progress and toleration of activity level   7/22/2023 1142 by Roberto Le RN  Outcome: Progressing  7/22/2023 1141 by Roberto Le RN  Outcome: Progressing     Problem: DISCHARGE PLANNING  Goal: Discharge to home or other facility with appropriate resources  Description: INTERVENTIONS:  - Identify barriers to discharge w/patient and caregiver  - Arrange for needed discharge resources and transportation as appropriate  - Identify discharge learning needs (meds, wound care, etc.)  - Arrange for interpretive services to assist at discharge as needed  - Refer to Case Management Department for coordinating discharge planning if the patient needs post-hospital services based on physician/advanced practitioner order or complex needs related to functional status, cognitive ability, or social support system  7/22/2023 1142 by Sami Schaeffer RN  Outcome: Progressing  7/22/2023 1141 by Sami Schaeffer RN  Outcome: Progressing     Problem: NEUROSENSORY - ADULT  Goal: Achieves stable or improved neurological status  Description: INTERVENTIONS  - Monitor and report changes in neurological status  - Monitor vital signs such as temperature, blood pressure, glucose, and any other labs ordered   - Initiate measures to prevent increased intracranial pressure  - Monitor for seizure activity and implement precautions if appropriate      7/22/2023 1142 by Sami Schaeffer RN  Outcome: Progressing  7/22/2023 1141 by Sami Schaeffer RN  Outcome: Progressing  Goal: Remains free of injury related to seizures activity  Description: INTERVENTIONS  - Maintain airway, patient safety  and administer oxygen as ordered  - Monitor patient for seizure activity, document and report duration and description of seizure to physician/advanced practitioner  - If seizure occurs,  ensure patient safety during seizure  - Reorient patient post seizure  - Seizure pads on all 4 side rails  - Instruct patient/family to notify RN of any seizure activity including if an aura is experienced  - Instruct patient/family to call for assistance with activity based on nursing assessment  - Administer anti-seizure medications if ordered    Outcome: Progressing  Goal: Achieves maximal functionality and self care  Description: INTERVENTIONS  - Monitor swallowing and airway patency with patient fatigue and changes in neurological status  - Encourage and assist patient to increase activity and self care.    - Encourage visually impaired, hearing impaired and aphasic patients to use assistive/communication devices  Outcome: Progressing     Problem: METABOLIC, FLUID AND ELECTROLYTES - ADULT  Goal: Electrolytes maintained within normal limits  Description: INTERVENTIONS:  - Monitor labs and assess patient for signs and symptoms of electrolyte imbalances  - Administer electrolyte replacement as ordered  - Monitor response to electrolyte replacements, including repeat lab results as appropriate  - Instruct patient on fluid and nutrition as appropriate  7/22/2023 1142 by Willis Rehman RN  Outcome: Progressing  7/22/2023 1141 by Willis Rehman RN  Outcome: Progressing  Goal: Fluid balance maintained  Description: INTERVENTIONS:  - Monitor labs   - Monitor I/O and WT  - Instruct patient on fluid and nutrition as appropriate  - Assess for signs & symptoms of volume excess or deficit  7/22/2023 1142 by Willis Rehmna RN  Outcome: Progressing  7/22/2023 1141 by Willis Rehman RN  Outcome: Progressing     Problem: Knowledge Deficit  Goal: Patient/family/caregiver demonstrates understanding of disease process, treatment plan, medications, and discharge instructions  Description: Complete learning assessment and assess knowledge base.   Interventions:  - Provide teaching at level of understanding  - Provide teaching via preferred learning methods  Outcome: Progressing     Problem: CARDIOVASCULAR - ADULT  Goal: Maintains optimal cardiac output and hemodynamic stability  Description: INTERVENTIONS:  - Monitor I/O, vital signs and rhythm  - Monitor for S/S and trends of decreased cardiac output  - Administer and titrate ordered vasoactive medications to optimize hemodynamic stability  - Assess quality of pulses, skin color and temperature  - Assess for signs of decreased coronary artery perfusion  - Instruct patient to report change in severity of symptoms  Outcome: Progressing  Goal: Absence of cardiac dysrhythmias or at baseline rhythm  Description: INTERVENTIONS:  - Continuous cardiac monitoring, vital signs, obtain 12 lead EKG if ordered  - Administer antiarrhythmic and heart rate control medications as ordered  - Monitor electrolytes and administer replacement therapy as ordered  Outcome: Progressing

## 2023-07-22 NOTE — ASSESSMENT & PLAN NOTE
Patient with past medical history significant hypertension initially presented with facial droop  Facial droop began approximately 2 hours prior to arrival  Facial droop has since resolved  Neuro exam otherwise normal  CTA without any acute abnormalities  Neurology consulted recommended stroke pathway  Check MRI brain  PT/OT  Telemetry monitoring  Neurochecks  Follow-up formal neurology consult  Allow permissive hypertension for now

## 2023-07-22 NOTE — ASSESSMENT & PLAN NOTE
Patient reports compliance with home regimen, has PCP appointment on Wednesday, advised follow-up.     Again resume home meds, outpatient blood pressure check

## 2023-07-22 NOTE — OCCUPATIONAL THERAPY NOTE
Occupational Therapy Evaluation     Patient Name: Suma ALLANNAyleenX Date: 7/22/2023  Problem List  Principal Problem:    Stroke-like symptoms  Active Problems:    HTN (hypertension)    BREANNA (generalized anxiety disorder)    Past Medical History  History reviewed. No pertinent past medical history. Past Surgical History  History reviewed. No pertinent surgical history. 07/22/23 0829   OT Last Visit   OT Visit Date 08/05/23   Note Type   Note type Evaluation   Pain Assessment   Pain Assessment Tool 0-10   Restrictions/Precautions   Weight Bearing Precautions Per Order No   Home Living   Type of [address] [place] [address] Dr One level;Performs ADLs on one level  (0 EDILBERTO)   Bathroom Shower/Tub Tub/shower unit   Bathroom Toilet Standard   Bathroom Accessibility Accessible   Additional Comments Reports no DME at baseline   Prior Function   Level of [place] Independent with ADLs; Independent with functional mobility   Lives With Spouse  (children)   Receives Help From Family   IADLs Independent with meal prep; Independent with medication management; Family/Friend/Other provides transportation  (Spouse drives at baseline)   Falls in the last 6 months 0   ADL   Where Assessed Edge of bed   Grooming Assistance 6  Modified Independent   UB Dressing Assistance 6  Modified independent   LB Dressing Assistance 6  Modified independent   Toileting Assistance  6  Modified independent   Bed Mobility   Supine to Sit 6  Modified independent   Additional Comments Patient OOB at end of session. Transfers   Sit to Stand 6  Modified independent   Additional items Increased time required   Stand to Sit 6  Modified independent   Additional items Increased time required   Functional Mobility   Functional Mobility 6  Modified independent   Additional Comments Patient performs functional mobility in the room and in the hallway without an AD. Performs with no LOB and good safety awareness throughout.    Balance   Static Sitting Good   Dynamic Sitting Good   Static Standing Good   Dynamic Standing Good   Activity Tolerance   Activity Tolerance Patient limited by fatigue   Medical Staff Made Aware PT Elsie   RUE Assessment   RUE Assessment WNL   LUE Assessment   LUE Assessment WNL   Hand Function   Gross Motor Coordination Functional   Fine Motor Coordination Functional   Hand Function Comments Performs tip to tip opposition and rapid sup/pro without difficulties. Sensation   Light Touch No apparent deficits   Sharp/Dull No apparent deficits   Vision-Basic Assessment   Current Vision No visual deficits   Cognition   Overall Cognitive Status WFL   Orientation Level Oriented X4   Assessment   Assessment Pt is a 44 y.o. female seen for OT evaluation s/p admit to [facility] on 7/21/2023 w/ Stroke-like symptoms. Comorbidities affecting pt's functional performance at time of assessment include: BREANNA, HTN, chest pain. Personal factors affecting pt at time of IE include:health management . Prior to admission, pt was (I) c ADLs, IADLs, transfers, and mobility. Spouse drives. Upon evaluation: Pt demonstrates ability to perform ADLs, transfers, and mobility at an (I) to Mod (I) level; primarily limited by fatigue. Patient appears to be functioning at baseline therefore no further skilled acute care OT needs needed at this time. Recommend continued mobilization with hospital staff and restorative services while in the hospital to increase pt's endurance and strength upon D/C. From OT standpoint, recommend D/C to home with family support when medically cleared. D/C pt from OT caseload at this time. The patient's raw score on the -PAC Daily Activity Inpatient Short Form is 24. A raw score of greater than or equal to 19 suggests the patient may benefit from discharge to home. Please refer to the recommendation of the Occupational Therapist for safe discharge planning.  Pt benefited from co-evaluation of skilled OT and PT therapists in order to most appropriately address functional deficits d/t assistance required for safe functional mobility, decreased activity tolerance, and regression from functioning level prior to admission and/or onset of present illness. OT/PT objectives were addressed separately; please see PT note for specific goal areas targeted. Goals   Patient Goals Return home   Recommendation   OT Discharge Recommendation No rehabilitation needs   AM-PAC Daily Activity Inpatient   Lower Body Dressing 4   Bathing 4   Toileting 4   Upper Body Dressing 4   Grooming 4   Eating 4   Daily Activity Raw Score 24   Daily Activity Standardized Score (Calc for Raw Score >=11) 57.54   AM-PAC Applied Cognition Inpatient   Following a Speech/Presentation 4   Understanding Ordinary Conversation 4   Taking Medications 4   Remembering Where Things Are Placed or Put Away 4   Remembering List of 4-5 Errands 4   Taking Care of Complicated Tasks 4   Applied Cognition Raw Score 24   Applied Cognition Standardized Score 62.21     Nursing present with patient end of session. No further skilled OT needs.      Jacinta Taylor

## 2025-02-09 ENCOUNTER — HOSPITAL ENCOUNTER (EMERGENCY)
Facility: HOSPITAL | Age: 45
Discharge: HOME | End: 2025-02-09
Attending: EMERGENCY MEDICINE
Payer: COMMERCIAL

## 2025-02-09 VITALS
WEIGHT: 193 LBS | DIASTOLIC BLOOD PRESSURE: 86 MMHG | OXYGEN SATURATION: 98 % | HEART RATE: 91 BPM | TEMPERATURE: 98 F | RESPIRATION RATE: 16 BRPM | SYSTOLIC BLOOD PRESSURE: 130 MMHG

## 2025-02-09 DIAGNOSIS — L29.9 ITCHING: Primary | ICD-10-CM

## 2025-02-09 PROCEDURE — 99283 EMERGENCY DEPT VISIT LOW MDM: CPT

## 2025-02-09 PROCEDURE — 99284 EMERGENCY DEPT VISIT MOD MDM: CPT | Performed by: EMERGENCY MEDICINE

## 2025-02-09 NOTE — DISCHARGE INSTRUCTIONS
Please use Allegra for symptomatic relief.    Please follow-up with your family doctor for reassessment and further management of facial itching.    Return to the emergency department if you experience any difficulty breathing, chest pain, lip or facial swelling.    You for allowing us take part in your care.

## 2025-02-09 NOTE — ED PROVIDER NOTES
Time reflects when diagnosis was documented in both MDM as applicable and the Disposition within this note       Time User Action Codes Description Comment    2/9/2025  3:51 PM Koko, Omar QUINTERO Add [L29.9] Itching           ED Disposition       ED Disposition   Discharge    Condition   Stable    Date/Time   Sun Feb 9, 2025  3:53 PM    Comment   Ayala Rosa discharge to home/self care.                   Assessment & Plan       Medical Decision Making  44-year-old female presenting due to itching and burning of whole face.  Patient states has been going on for 5 days as well as pain in her bilateral ears.  Patient has the same symptoms in the past probably for 5 times within the last couple years, symptoms are typically self resolved.  Denies any rash, lip or face swelling, difficulty breathing, shortness of breath, chest pain, abdominal pain, fevers, chills.  Patient also did endorse some vaginal itching a few days ago which has resolved after using some cream.  Patient does state that her itching has improved with the use of Allegra.  Otherwise no known new exposures, foods, lotions or medications.    Physical exam is unremarkable, no rash, swelling abrasions appreciated.  Normal ear exam.  Normal breath sounds bilaterally no stridor or wheezing.  No abdominal pain or tenderness.  At this time with previous episodes self-limiting with use of Allegra as well as current symptoms improving with Allegra use, recommend patient should continue with Allegra and follow-up with her family doctor for reassessment and management.  This may be due to medications which family doctor will be able to review and alter if necessary, but no swelling or edema appreciated to suggest ACE inhibitor induced angioedema.  Otherwise patient is in no acute distress, vitals WNL.  Patient is agreeable and appropriate for discharge at this time.  Return cautions discussed.        ED Course as of 02/09/25 1624   Sun Feb 09, 2025    1514 5d itchiness and burning of whole face.  Pain b/l ears outside. Happened 4 yrs ago while preg in past but was over the whole body- allergy medication was given at that time.  Vaginal itchiness resolved after using a cream. Dry cough- momentarily last night, none today    Took allegra- helps, benadryl- didn't help and tried Cetaphil lotion    No dif breathing, CP, abd pain, N/V, inf symptom.   1524 Blood Pressure: 141/89   1526 Temperature(!): 97.1 °F (36.2 °C)   1526 Pulse: 98   1526 Respirations: 15   1526 SpO2: 99 %       Medications - No data to display    ED Risk Strat Scores                          SBIRT 20yo+      Flowsheet Row Most Recent Value   Initial Alcohol Screen: US AUDIT-C     1. How often do you have a drink containing alcohol? 0 Filed at: 02/09/2025 1419   2. How many drinks containing alcohol do you have on a typical day you are drinking?  0 Filed at: 02/09/2025 1419   3a. Male UNDER 65: How often do you have five or more drinks on one occasion? 0 Filed at: 02/09/2025 1419   3b. FEMALE Any Age, or MALE 65+: How often do you have 4 or more drinks on one occassion? 0 Filed at: 02/09/2025 1419   Audit-C Score 0 Filed at: 02/09/2025 1419   KAILA: How many times in the past year have you...    Used an illegal drug or used a prescription medication for non-medical reasons? Never Filed at: 02/09/2025 1419                            History of Present Illness       Chief Complaint   Patient presents with    Itching     Patient reports 5 days of itching in face. Patient also reports vaginal itching       History reviewed. No pertinent past medical history.   History reviewed. No pertinent surgical history.   History reviewed. No pertinent family history.   Social History     Tobacco Use    Smoking status: Never    Smokeless tobacco: Never   Substance Use Topics    Alcohol use: Never    Drug use: Never      E-Cigarette/Vaping      E-Cigarette/Vaping Substances      I have reviewed and agree with the  history as documented.     44-year-old female presenting due to itching and burning of whole face.  Patient states has been going on for 5 days as well as pain in her bilateral ears.  Patient has the same symptoms in the past probably for 5 times within the last couple years, symptoms are typically self resolved.  Denies any rash, lip or face swelling, difficulty breathing, shortness of breath, chest pain, abdominal pain, fevers, chills.  Patient also did endorse some vaginal itching a few days ago which has resolved after using some cream.  Patient does state that her itching has improved with the use of Allegra.  Otherwise no known new exposures, foods, lotions or medications.        Review of Systems   Constitutional:  Negative for chills and fever.   HENT:  Positive for ear pain. Negative for dental problem, ear discharge, facial swelling, rhinorrhea, sore throat, trouble swallowing and voice change.    Eyes:  Negative for photophobia, redness and visual disturbance.   Respiratory:  Negative for shortness of breath.    Cardiovascular:  Negative for chest pain and palpitations.   Gastrointestinal:  Negative for abdominal distention, nausea and vomiting.   Genitourinary:  Negative for dysuria and flank pain.   Musculoskeletal:  Negative for neck pain.   Skin:  Negative for rash and wound.   Neurological:  Negative for tremors, syncope, facial asymmetry, speech difficulty, weakness, light-headedness, numbness and headaches.           Objective       ED Triage Vitals [02/09/25 1417]   Temperature Pulse Blood Pressure Respirations SpO2 Patient Position - Orthostatic VS   (!) 97.1 °F (36.2 °C) 98 141/89 15 99 % Sitting      Temp Source Heart Rate Source BP Location FiO2 (%) Pain Score    Temporal Monitor Right arm -- No Pain      Vitals      Date and Time Temp Pulse SpO2 Resp BP Pain Score FACES Pain Rating User   02/09/25 1530 98 °F (36.7 °C) 91 98 % 16 130/86 No Pain -- JL   02/09/25 1500 97.3 °F (36.3 °C) 91 99 %  16 130/85 No Pain --    02/09/25 1417 97.1 °F (36.2 °C) 98 99 % 15 141/89 No Pain -- PP            Physical Exam  Vitals and nursing note reviewed.   Constitutional:       General: She is not in acute distress.     Appearance: She is well-developed.   HENT:      Head: Normocephalic and atraumatic.      Right Ear: Tympanic membrane, ear canal and external ear normal.      Left Ear: Tympanic membrane, ear canal and external ear normal.      Nose: Nose normal. No congestion or rhinorrhea.      Mouth/Throat:      Mouth: Mucous membranes are moist.      Pharynx: Oropharynx is clear.   Eyes:      Extraocular Movements: Extraocular movements intact.      Conjunctiva/sclera: Conjunctivae normal.      Pupils: Pupils are equal, round, and reactive to light.   Cardiovascular:      Rate and Rhythm: Normal rate and regular rhythm.      Pulses: Normal pulses.      Heart sounds: Normal heart sounds. No murmur heard.  Pulmonary:      Effort: Pulmonary effort is normal. No respiratory distress.      Breath sounds: Normal breath sounds. No stridor. No wheezing.   Chest:      Chest wall: No tenderness.   Abdominal:      General: Abdomen is flat. Bowel sounds are normal.      Palpations: Abdomen is soft.      Tenderness: There is no abdominal tenderness. There is no right CVA tenderness or left CVA tenderness.   Musculoskeletal:         General: No deformity or signs of injury. Normal range of motion.      Cervical back: Normal range of motion and neck supple. No rigidity or tenderness.      Right lower leg: No edema.      Left lower leg: No edema.   Skin:     General: Skin is warm and dry.      Findings: No bruising, lesion or rash.   Neurological:      General: No focal deficit present.      Mental Status: She is alert and oriented to person, place, and time.      Cranial Nerves: No cranial nerve deficit.      Sensory: No sensory deficit.         Results Reviewed       None            No orders to display       Procedures    ED  Medication and Procedure Management   Prior to Admission Medications   Prescriptions Last Dose Informant Patient Reported? Taking?   NIFEdipine (PROCARDIA XL) 30 mg 24 hr tablet   No No   Sig: Take 1 tablet (30 mg total) by mouth daily   aspirin 81 mg chewable tablet   No No   Sig: Chew 1 tablet (81 mg total) daily Do not start before July 23, 2023.   busPIRone (BUSPAR) 5 mg tablet   No No   Sig: Take 1 tablet (5 mg total) by mouth 2 (two) times a day   lisinopril (ZESTRIL) 10 mg tablet   No No   Sig: Take 1 tablet (10 mg total) by mouth daily      Facility-Administered Medications: None     Discharge Medication List as of 2/9/2025  3:53 PM        CONTINUE these medications which have NOT CHANGED    Details   aspirin 81 mg chewable tablet Chew 1 tablet (81 mg total) daily Do not start before July 23, 2023., Starting Sun 7/23/2023, Normal      busPIRone (BUSPAR) 5 mg tablet Take 1 tablet (5 mg total) by mouth 2 (two) times a day, Starting Sat 7/22/2023, Normal      lisinopril (ZESTRIL) 10 mg tablet Take 1 tablet (10 mg total) by mouth daily, Starting Sat 7/22/2023, No Print      NIFEdipine (PROCARDIA XL) 30 mg 24 hr tablet Take 1 tablet (30 mg total) by mouth daily, Starting Sat 7/22/2023, No Print           No discharge procedures on file.  ED SEPSIS DOCUMENTATION   Time reflects when diagnosis was documented in both MDM as applicable and the Disposition within this note       Time User Action Codes Description Comment    2/9/2025  3:51 PM Omar Sutherland Add [L29.9] Itching                  Omar Sutherland MD  02/09/25 8878

## 2025-02-10 NOTE — ED ATTENDING ATTESTATION
Final Diagnosis:  1. Itching           I, Osvaldo Suh MD, saw and evaluated the patient. All available labs and X-rays were ordered by me or the resident and have been reviewed by myself. I discussed the patient with the resident / non-physician and agree with the resident's / non-physician practitioner's findings and plan as documented in the resident's / non-physician practicitioner's note, except where noted.   At this point, I agree with the current assessment done in the ED.   I was present during key portions of all procedures performed unless otherwise stated.     Chief Complaint   Patient presents with    Itching     Patient reports 5 days of itching in face. Patient also reports vaginal itching     This is a 44 y.o. female presenting for evaluation of itching sensation across the face.  No swelling.  No difficulty breathing.  Has had issues like this in the past.  No new exposures.    PMH:   has no past medical history on file.    PSH:   has no past surgical history on file.    Procedures     Social:  Social History     Substance and Sexual Activity   Alcohol Use Never     Social History     Tobacco Use   Smoking Status Never   Smokeless Tobacco Never     Social History     Substance and Sexual Activity   Drug Use Never     PE:  Vitals:    02/09/25 1417 02/09/25 1500 02/09/25 1530   BP: 141/89 130/85 130/86   BP Location: Right arm     Pulse: 98 91 91   Resp: 15 16 16   Temp: (!) 97.1 °F (36.2 °C) (!) 97.3 °F (36.3 °C) 98 °F (36.7 °C)   TempSrc: Temporal Temporal Temporal   SpO2: 99% 99% 98%   Weight: 87.5 kg (193 lb)         A:    Unless otherwise specified above:     General: VS reviewed  Appears in NAD     Head: Normocephalic, atraumatic     CV: No pallor noted  Lungs:   No respiratory distress     Abdomen:  Soft, non-tender, non-distended     MSK:   No obvious deformity     Skin: No obvious rash.     Neuro: Awake, alert, GCS15, CN II-XII grossly intact. Speaking in full sentences.   Motor grossly  intact.     Psychiatric/Behavioral: Appropriate mood and affect   Exam: deferred    P:  -Patient with reassuring exam.  Continue symptomatic management follow-up with primary care.  - 13 point ROS was performed and all are normal unless stated in the history above.   - Nursing note reviewed. Vitals reviewed.   - Orders placed by myself and/or advanced practitioner / resident.    - Previous chart was reviewed  - No language barrier.   - History obtained from patient.   - There are no limitations to the history obtained.     Unless otherwise specified:  CC is exclusive from any separately billable procedures  CC is exclusive of treating other patients  CC is exclusive of teaching time     Code Status: Prior  Advance Directive and Living Will:      Power of :    POLST:      Medications - No data to display  No orders to display     No orders of the defined types were placed in this encounter.    Labs Reviewed - No data to display  Time reflects when diagnosis was documented in both MDM as applicable and the Disposition within this note       Time User Action Codes Description Comment    2/9/2025  3:51 PM Omar Sutherland Add [L29.9] Itching           ED Disposition       ED Disposition   Discharge    Condition   Stable    Date/Time   Sun Feb 9, 2025  3:53 PM    Comment   Ayala Rosa discharge to home/self care.                   Follow-up Information       Follow up With Specialties Details Why Contact Info Additional Information    [facility] Emergency Department Emergency Medicine   [address] [facility] [facility] [address] [phone] [facility] Emergency Department, [address], [place], [address]    [facility]   [address] [facility] [address] [phone] [facility], [address] [place] [address], [address] [phone]          Discharge Medication List as of  "2/9/2025  3:53 PM        CONTINUE these medications which have NOT CHANGED    Details   aspirin 81 mg chewable tablet Chew 1 tablet (81 mg total) daily Do not start before July 23, 2023., Starting Sun 7/23/2023, Normal      busPIRone (BUSPAR) 5 mg tablet Take 1 tablet (5 mg total) by mouth 2 (two) times a day, Starting Sat 7/22/2023, Normal      lisinopril (ZESTRIL) 10 mg tablet Take 1 tablet (10 mg total) by mouth daily, Starting Sat 7/22/2023, No Print      NIFEdipine (PROCARDIA XL) 30 mg 24 hr tablet Take 1 tablet (30 mg total) by mouth daily, Starting Sat 7/22/2023, No Print           No discharge procedures on file.  Prior to Admission Medications   Prescriptions Last Dose Informant Patient Reported? Taking?   NIFEdipine (PROCARDIA XL) 30 mg 24 hr tablet   No No   Sig: Take 1 tablet (30 mg total) by mouth daily   aspirin 81 mg chewable tablet   No No   Sig: Chew 1 tablet (81 mg total) daily Do not start before July 23, 2023.   busPIRone (BUSPAR) 5 mg tablet   No No   Sig: Take 1 tablet (5 mg total) by mouth 2 (two) times a day   lisinopril (ZESTRIL) 10 mg tablet   No No   Sig: Take 1 tablet (10 mg total) by mouth daily      Facility-Administered Medications: None       Portions of the record may have been created with voice recognition software. Occasional wrong word or \"sound a like\" substitutions may have occurred due to the inherent limitations of voice recognition software. Read the chart carefully and recognize, using context, where substitutions have occurred.    Electronically signed by:  Osvaldo Suh    "

## 2025-02-16 ENCOUNTER — APPOINTMENT (EMERGENCY)
Dept: RADIOLOGY | Facility: HOSPITAL | Age: 45
End: 2025-02-16
Payer: COMMERCIAL

## 2025-02-16 ENCOUNTER — HOSPITAL ENCOUNTER (EMERGENCY)
Facility: HOSPITAL | Age: 45
Discharge: HOME | End: 2025-02-16
Payer: COMMERCIAL

## 2025-02-16 VITALS
DIASTOLIC BLOOD PRESSURE: 81 MMHG | TEMPERATURE: 98.9 F | OXYGEN SATURATION: 96 % | HEART RATE: 82 BPM | RESPIRATION RATE: 19 BRPM | SYSTOLIC BLOOD PRESSURE: 151 MMHG

## 2025-02-16 DIAGNOSIS — R21 RASH: ICD-10-CM

## 2025-02-16 DIAGNOSIS — B34.9 ACUTE VIRAL SYNDROME: Primary | ICD-10-CM

## 2025-02-16 LAB
FLUAV AG UPPER RESP QL IA.RAPID: NEGATIVE
FLUBV AG UPPER RESP QL IA.RAPID: NEGATIVE
SARS-COV+SARS-COV-2 AG RESP QL IA.RAPID: NEGATIVE

## 2025-02-16 PROCEDURE — 87811 SARS-COV-2 COVID19 W/OPTIC: CPT | Performed by: EMERGENCY MEDICINE

## 2025-02-16 PROCEDURE — 99283 EMERGENCY DEPT VISIT LOW MDM: CPT

## 2025-02-16 PROCEDURE — 71046 X-RAY EXAM CHEST 2 VIEWS: CPT

## 2025-02-16 PROCEDURE — 87804 INFLUENZA ASSAY W/OPTIC: CPT | Performed by: EMERGENCY MEDICINE

## 2025-02-16 PROCEDURE — 99284 EMERGENCY DEPT VISIT MOD MDM: CPT

## 2025-02-16 RX ORDER — FAMOTIDINE 20 MG/1
20 TABLET, FILM COATED ORAL 2 TIMES DAILY
Qty: 30 TABLET | Refills: 0 | Status: SHIPPED | OUTPATIENT
Start: 2025-02-16

## 2025-02-17 NOTE — DISCHARGE INSTRUCTIONS
Return sooner to the Emergency Department if increased shortness of breath, fever, pain, vomiting, bleeding, weakness, dizziness.     Please follow-up with your primary care provider for further evaluation and monitoring of your symptoms.  Please utilize the provided medications to help with the skin changes that you noted as well as the cough and congestion that you have been experiencing.

## 2025-02-17 NOTE — ED PROVIDER NOTES
Time reflects when diagnosis was documented in both MDM as applicable and the Disposition within this note       Time User Action Codes Description Comment    2/16/2025 10:02 PM Alexander Abraham [B34.9] Acute viral syndrome     2/16/2025 10:02 PM Alexander Abraham [R21] Rash           ED Disposition       ED Disposition   Discharge    Condition   Stable    Date/Time   Sun Feb 16, 2025 10:00 PM    Comment   Ayala Kuhnsuscrisostomo discharge to home/self care.                   Assessment & Plan       Medical Decision Making  Patient is a 45-year-old female with symptomology that appears most consistent with acute viral syndrome.    DDx including but not limited to:  URI, bronchitis, pneumonia, GERD, aspiration pneumonitis, viral illness, COVID 19, smoke inhalation, CO poisoning, adverse medication reaction.     Based of initial presenting complaints, appears to be most consistent with acute viral syndrome based off history acquisition as well as duration of symptoms.  Chest radiograph was conducted with no acute cardiopulmonary pathology appreciated on my wet read.  Patient's vitals within normal limits.  Patient provided with recommendations on additional medications when can utilize for rash utilization/medication as well as to help with cough and congestion symptoms.  Encouraged to follow-up with primary care provider for continued evaluation and assessment following initiation of these medications.  Family expressed understanding with this plan and strict return precautions that warrant continued evaluation in the emergency department were discussed at length at bedside.    Amount and/or Complexity of Data Reviewed  Independent Historian: guardian  Radiology: ordered and independent interpretation performed.    Risk  OTC drugs.             Medications - No data to display    ED Risk Strat Scores                            SBIRT 22yo+      Flowsheet Row Most Recent Value   Initial Alcohol  Screen: US AUDIT-C     1. How often do you have a drink containing alcohol? 0 Filed at: 02/16/2025 2013   2. How many drinks containing alcohol do you have on a typical day you are drinking?  0 Filed at: 02/16/2025 2013   3b. FEMALE Any Age, or MALE 65+: How often do you have 4 or more drinks on one occassion? 0 Filed at: 02/16/2025 2013   Audit-C Score 0 Filed at: 02/16/2025 2013                            History of Present Illness       Chief Complaint   Patient presents with    Cough     2 weeks sick. Cough, congestions, sore throat, generalized body aches, rash on arms, and abdomen. Taking allegra for rash. Tylenol as needed for fever.        History reviewed. No pertinent past medical history.   History reviewed. No pertinent surgical history.   History reviewed. No pertinent family history.   Social History     Tobacco Use    Smoking status: Never    Smokeless tobacco: Never   Substance Use Topics    Alcohol use: Never    Drug use: Never      E-Cigarette/Vaping      E-Cigarette/Vaping Substances      I have reviewed and agree with the history as documented.     Patient is a 45-year-old female who is accompanied by family members who is to provide translation for this Hungarian-speaking individual.  Patient has been experiencing cough, congestion for 2 weeks as well as skin changes over bilateral arms, chest/abdomen wall as well as face that are nonpruritic, nonpainful.  Patient has been utilizing Allegra for attempt to help with symptom control of cough congestion and skin changes with minimal improvement in symptoms.  Endorses chills at home but no recorded fevers.  Still able to eat and drink but describes myalgias throughout the entirety of her body appear to be consistent with cramping sensations.  No changes in urination or bowel movements.  Utilization of over-the-counter antipyretic medications only provide transitory relief.  Cough is nonproductive with no blood production.  Vitals are within normal  limits at time of presentation to the emergency department.    Also endorses sore throat but feels that this has been exacerbated with multiple episodes of coughing that she has been experiencing.      History provided by:  Patient   used: No    Cough  Associated symptoms: no chest pain, no chills, no diaphoresis, no ear pain, no fever, no rash, no shortness of breath and no sore throat        Review of Systems   Constitutional:  Positive for fatigue. Negative for activity change, appetite change, chills, diaphoresis and fever.   HENT:  Negative for ear pain and sore throat.    Eyes:  Negative for pain and visual disturbance.   Respiratory:  Positive for cough. Negative for shortness of breath.    Cardiovascular:  Negative for chest pain and palpitations.   Gastrointestinal:  Negative for abdominal pain and vomiting.   Genitourinary:  Negative for dysuria and hematuria.   Musculoskeletal:  Negative for arthralgias and back pain.   Skin:  Negative for color change and rash.   Neurological:  Negative for seizures and syncope.   All other systems reviewed and are negative.          Objective       ED Triage Vitals   Temperature Pulse Blood Pressure Respirations SpO2 Patient Position - Orthostatic VS   02/16/25 2006 02/16/25 2006 02/16/25 2006 02/16/25 2006 02/16/25 2006 02/16/25 2006   98.9 °F (37.2 °C) 82 151/81 19 96 % Sitting      Temp Source Heart Rate Source BP Location FiO2 (%) Pain Score    02/16/25 2006 02/16/25 2006 02/16/25 2006 -- 02/16/25 2011    Temporal Monitor Left arm  7      Vitals      Date and Time Temp Pulse SpO2 Resp BP Pain Score FACES Pain Rating User   02/16/25 2011 -- -- -- -- -- 7 -- RJY   02/16/25 2006 98.9 °F (37.2 °C) 82 96 % 19 151/81 -- -- TF            Physical Exam  Vitals and nursing note reviewed.   Constitutional:       General: She is not in acute distress.     Appearance: Normal appearance. She is well-developed. She is not ill-appearing or diaphoretic.   HENT:       Head: Normocephalic and atraumatic.      Right Ear: External ear normal.      Left Ear: External ear normal.      Nose: Nose normal. No congestion or rhinorrhea.      Mouth/Throat:      Mouth: Mucous membranes are moist.      Pharynx: No oropharyngeal exudate or posterior oropharyngeal erythema.   Eyes:      Extraocular Movements: Extraocular movements intact.      Conjunctiva/sclera: Conjunctivae normal.      Pupils: Pupils are equal, round, and reactive to light.   Cardiovascular:      Rate and Rhythm: Normal rate and regular rhythm.      Pulses: Normal pulses.      Heart sounds: No murmur heard.  Pulmonary:      Effort: Pulmonary effort is normal. No respiratory distress.      Breath sounds: Normal breath sounds. No wheezing.   Abdominal:      Palpations: Abdomen is soft.      Tenderness: There is no abdominal tenderness. There is no guarding or rebound.   Musculoskeletal:         General: No swelling.      Cervical back: Normal range of motion and neck supple.   Skin:     General: Skin is warm and dry.      Capillary Refill: Capillary refill takes less than 2 seconds.   Neurological:      Mental Status: She is alert and oriented to person, place, and time.   Psychiatric:         Mood and Affect: Mood normal.         Results Reviewed       Procedure Component Value Units Date/Time    FLU/COVID Rapid Antigen (30 min. TAT) - Preferred screening test in ED [[id number]]  (Normal) Collected: 02/16/25 2025    Lab Status: Final result Specimen: Nares from Nose Updated: 02/16/25 2100     SARS COV Rapid Antigen Negative     Influenza A Rapid Antigen Negative     Influenza B Rapid Antigen Negative    Narrative:      This test has been performed using the [employer] [facility] 2 FLU+SARS Antigen test under the Emergency Use Authorization (EUA). This test has been validated by the  and verified by the performing laboratory. The [facility] uses lateral flow immunofluorescent sandwich assay to detect SARS-COV, Influenza A  and Influenza B Antigen.     The [employer] [facility] 2 SARS Antigen test does not differentiate between SARS-CoV and SARS-CoV-2.     Negative results are presumptive and may be confirmed with a molecular assay, if necessary, for patient management. Negative results do not rule out SARS-CoV-2 or influenza infection and should not be used as the sole basis for treatment or patient management decisions. A negative test result may occur if the level of antigen in a sample is below the limit of detection of this test.     Positive results are indicative of the presence of viral antigens, but do not rule out bacterial infection or co-infection with other viruses.     All test results should be used as an adjunct to clinical observations and other information available to the provider.    FOR PEDIATRIC PATIENTS - copy/paste COVID Guidelines URL to browser: [email or web address]            XR chest 2 views   ED Interpretation by Alexander Abraham MD (02/16 2112)   No acute cardiopulmonary pathology appreciated.    Trachea is midline with no acute deviation noted.    Lung markings appreciated throughout all lung fields.  I do not appreciate signs of pneumothorax or fluid collection at this time.    No focal consolidations, opacities, or effusions noted on examination.    No acute, natasha, or obvious osseous pathology noted on inspection of the thoracic cavity and bilateral shoulder joints.    Able to visualize bilateral costophrenic angles.    Cardiac silhouette appears to be within appropriate dimensions.    Independently read and assessed by Alexander Abraham.            Procedures    ED Medication and Procedure Management   Prior to Admission Medications   Prescriptions Last Dose Informant Patient Reported? Taking?   NIFEdipine (PROCARDIA XL) 30 mg 24 hr tablet 2/16/2025  No Yes   Sig: Take 1 tablet (30 mg total) by mouth daily   aspirin 81 mg chewable tablet Not  Taking  No No   Sig: Chew 1 tablet (81 mg total) daily Do not start before July 23, 2023.   Patient not taking: Reported on 2/16/2025   busPIRone (BUSPAR) 5 mg tablet 2/16/2025  No Yes   Sig: Take 1 tablet (5 mg total) by mouth 2 (two) times a day   lisinopril (ZESTRIL) 10 mg tablet 2/16/2025  No Yes   Sig: Take 1 tablet (10 mg total) by mouth daily      Facility-Administered Medications: None     Discharge Medication List as of 2/16/2025 10:04 PM        START taking these medications    Details   dextromethorphan-guaifenesin (MUCINEX DM)  MG per 12 hr tablet Take 1 tablet by mouth every 12 (twelve) hours, Starting Sun 2/16/2025, Normal      famotidine (PEPCID) 20 mg tablet Take 1 tablet (20 mg total) by mouth 2 (two) times a day, Starting Sun 2/16/2025, Normal           CONTINUE these medications which have NOT CHANGED    Details   busPIRone (BUSPAR) 5 mg tablet Take 1 tablet (5 mg total) by mouth 2 (two) times a day, Starting Sat 7/22/2023, Normal      lisinopril (ZESTRIL) 10 mg tablet Take 1 tablet (10 mg total) by mouth daily, Starting Sat 7/22/2023, No Print      NIFEdipine (PROCARDIA XL) 30 mg 24 hr tablet Take 1 tablet (30 mg total) by mouth daily, Starting Sat 7/22/2023, No Print      aspirin 81 mg chewable tablet Chew 1 tablet (81 mg total) daily Do not start before July 23, 2023., Starting Sun 7/23/2023, Normal           No discharge procedures on file.  ED SEPSIS DOCUMENTATION   Time reflects when diagnosis was documented in both MDM as applicable and the Disposition within this note       Time User Action Codes Description Comment    2/16/2025 10:02 PM Alexander Abraham [B34.9] Acute viral syndrome     2/16/2025 10:02 PM Alexander Abraham [R21] Rash                  Alexander Abraham MD  02/17/25 0143

## 2025-05-31 ENCOUNTER — HOSPITAL ENCOUNTER (EMERGENCY)
Facility: HOSPITAL | Age: 45
Discharge: HOME | End: 2025-05-31
Attending: EMERGENCY MEDICINE | Admitting: EMERGENCY MEDICINE
Payer: COMMERCIAL

## 2025-05-31 VITALS
SYSTOLIC BLOOD PRESSURE: 140 MMHG | HEART RATE: 75 BPM | OXYGEN SATURATION: 97 % | TEMPERATURE: 98.5 F | DIASTOLIC BLOOD PRESSURE: 104 MMHG | RESPIRATION RATE: 18 BRPM

## 2025-05-31 DIAGNOSIS — M54.9 BACK PAIN: Primary | ICD-10-CM

## 2025-05-31 DIAGNOSIS — M54.50 ACUTE LOW BACK PAIN: ICD-10-CM

## 2025-05-31 DIAGNOSIS — S39.012A STRAIN OF LUMBAR REGION, INITIAL ENCOUNTER: ICD-10-CM

## 2025-05-31 PROCEDURE — 99284 EMERGENCY DEPT VISIT MOD MDM: CPT | Performed by: EMERGENCY MEDICINE

## 2025-05-31 PROCEDURE — 99282 EMERGENCY DEPT VISIT SF MDM: CPT

## 2025-05-31 RX ORDER — IBUPROFEN 600 MG/1
600 TABLET, FILM COATED ORAL EVERY 6 HOURS PRN
Qty: 30 TABLET | Refills: 0 | Status: SHIPPED | OUTPATIENT
Start: 2025-05-31

## 2025-05-31 RX ORDER — IBUPROFEN 800 MG/1
800 TABLET, FILM COATED ORAL ONCE
Status: COMPLETED | OUTPATIENT
Start: 2025-05-31 | End: 2025-05-31

## 2025-05-31 RX ORDER — METHOCARBAMOL 500 MG/1
500 TABLET, FILM COATED ORAL 2 TIMES DAILY
Qty: 20 TABLET | Refills: 0 | Status: SHIPPED | OUTPATIENT
Start: 2025-05-31

## 2025-05-31 RX ADMIN — IBUPROFEN 800 MG: 800 TABLET, FILM COATED ORAL at 12:01

## 2025-05-31 NOTE — ED PROVIDER NOTES
Time reflects when diagnosis was documented in both MDM as applicable and the Disposition within this note       Time User Action Codes Description Comment    5/31/2025 11:34 AM Zachariah Dykes Add [M54.9] Back pain     5/31/2025 11:34 AM Zachariah Dykes Add [M54.50] Acute low back pain     5/31/2025 11:34 AM Zachariah Dykes Add [S39.012A] Strain of lumbar region, initial encounter           ED Disposition       ED Disposition   Discharge    Condition   Stable    Date/Time   Sat May 31, 2025 11:34 AM    Comment   Ayala العراقيjoseostomluda discharge to home/self care.                   Assessment & Plan       Medical Decision Making    Patient is a 45-year-old female complaining of approximately 1 week history of low back pain.     Low suspicion for epidural abscess, malignancy, renal colic given the acuity of pain localized to the right lumbar paraspinal musculature.  No zoster rash.  Will treat with Medrol Dosepak, Robaxin, [facility].    Problems Addressed:  Acute low back pain: acute illness or injury  Back pain: acute illness or injury  Strain of lumbar region, initial encounter: acute illness or injury    Risk  OTC drugs.  Prescription drug management.             Medications   ibuprofen (MOTRIN) tablet 800 mg (800 mg Oral Given 5/31/25 1201)       ED Risk Strat Scores      I personally discussed return precautions with this patient and family. I provided the patient with written discharge instructions and particularly highlighted specific areas of interest to this patient, including but not limited to: medications for symptom managment, follow up recommendations, and return precautions. Patient and family are in agreement with this plan as outlined above.              No data recorded                            History of Present Illness       Chief Complaint   Patient presents with    Back Pain     Lower back pain for a few days, states chair broke underneath her  last week. States pain has been ongoing and getting worse       Past Medical History[1]   Past Surgical History[2]   Family History[3]   Social History[4]   E-Cigarette/Vaping      E-Cigarette/Vaping Substances      I have reviewed and agree with the history as documented.     Greek  used for obtaining medical information, obtaining past medical history, history of present illness, review of systems and discussion of discharge planning and follow-up.    Patient is a 45-year-old female complaining of approximately 1 week history of low back pain.  Pain does not radiate down her legs or into her buttocks but states in the lumbar paraspinal musculature right greater than left.  Patient states approximately 3 weeks ago she sat on a chair that gave out causing her to fall to the ground.  She did not experience back pain during that event however.  No bowel or bladder incontinence or retention.  No perineal anesthesia.      Back Pain  Associated symptoms: no abdominal pain, no chest pain, no dysuria, no fever, no headaches and no pelvic pain        Review of Systems   Constitutional:  Negative for appetite change, chills, fatigue, fever and unexpected weight change.   HENT:  Negative for congestion, ear pain, rhinorrhea and sore throat.    Eyes:  Negative for pain and visual disturbance.   Respiratory:  Negative for cough, chest tightness, shortness of breath and wheezing.    Cardiovascular:  Negative for chest pain, palpitations and leg swelling.   Gastrointestinal:  Negative for abdominal pain, constipation, diarrhea, nausea and vomiting.   Genitourinary:  Negative for difficulty urinating, dysuria, frequency, hematuria, menstrual problem, pelvic pain, vaginal bleeding and vaginal discharge.   Musculoskeletal:  Positive for back pain. Negative for arthralgias and neck pain.   Skin:  Negative for color change and rash.   Neurological:  Negative for dizziness, seizures, syncope, light-headedness and  headaches.   Psychiatric/Behavioral:  Negative for sleep disturbance.    All other systems reviewed and are negative.          Objective       ED Triage Vitals   Temperature Pulse Blood Pressure Respirations SpO2 Patient Position - Orthostatic VS   05/31/25 1117 05/31/25 1117 05/31/25 1117 05/31/25 1117 05/31/25 1117 05/31/25 1117   98.5 °F (36.9 °C) 75 (!) 140/104 18 97 % Sitting      Temp Source Heart Rate Source BP Location FiO2 (%) Pain Score    05/31/25 1117 05/31/25 1117 -- -- 05/31/25 1201    Temporal Monitor   8      Vitals      Date and Time Temp Pulse SpO2 Resp BP Pain Score FACES Pain Rating User   05/31/25 1201 -- -- -- -- -- 8 -- JL   05/31/25 1117 98.5 °F (36.9 °C) 75 97 % 18 140/104 -- -- JL            Physical Exam  Vitals and nursing note reviewed.   Constitutional:       General: She is not in acute distress.     Appearance: Normal appearance. She is well-developed and normal weight. She is not ill-appearing, toxic-appearing or diaphoretic.   HENT:      Head: Normocephalic and atraumatic.      Nose: Nose normal.      Mouth/Throat:      Mouth: Mucous membranes are moist.      Pharynx: Oropharynx is clear.     Eyes:      General: No scleral icterus.     Extraocular Movements: Extraocular movements intact.      Conjunctiva/sclera: Conjunctivae normal.       Cardiovascular:      Rate and Rhythm: Normal rate and regular rhythm.      Pulses: Normal pulses.      Heart sounds: Normal heart sounds. No murmur heard.     No friction rub. No gallop.   Pulmonary:      Effort: Pulmonary effort is normal. No respiratory distress.      Breath sounds: Normal breath sounds. No wheezing or rales.   Chest:      Chest wall: No tenderness.   Abdominal:      General: Bowel sounds are normal. There is no distension.      Palpations: Abdomen is soft. There is no mass.      Tenderness: There is no abdominal tenderness. There is no right CVA tenderness, left CVA tenderness, guarding or rebound.      Hernia: No hernia is  present.     Musculoskeletal:         General: Tenderness present. No deformity. Normal range of motion.      Cervical back: Normal range of motion and neck supple. No rigidity or tenderness.        Back:       Right lower leg: No edema.      Left lower leg: No edema.   Lymphadenopathy:      Cervical: No cervical adenopathy.     Skin:     General: Skin is warm and dry.      Capillary Refill: Capillary refill takes less than 2 seconds.      Coloration: Skin is not jaundiced or pale.      Findings: No bruising, erythema, lesion or rash.     Neurological:      General: No focal deficit present.      Mental Status: She is alert and oriented to person, place, and time. Mental status is at baseline.     Psychiatric:         Mood and Affect: Mood normal.         Behavior: Behavior normal.         Results Reviewed       None            No orders to display       Procedures    ED Medication and Procedure Management   Prior to Admission Medications   Prescriptions Last Dose Informant Patient Reported? Taking?   NIFEdipine (PROCARDIA XL) 30 mg 24 hr tablet   No No   Sig: Take 1 tablet (30 mg total) by mouth daily   aspirin 81 mg chewable tablet   No No   Sig: Chew 1 tablet (81 mg total) daily Do not start before July 23, 2023.   Patient not taking: Reported on 2/16/2025   busPIRone (BUSPAR) 5 mg tablet   No No   Sig: Take 1 tablet (5 mg total) by mouth 2 (two) times a day   dextromethorphan-guaifenesin (MUCINEX DM)  MG per 12 hr tablet   No No   Sig: Take 1 tablet by mouth every 12 (twelve) hours   famotidine (PEPCID) 20 mg tablet   No No   Sig: Take 1 tablet (20 mg total) by mouth 2 (two) times a day   lisinopril (ZESTRIL) 10 mg tablet   No No   Sig: Take 1 tablet (10 mg total) by mouth daily      Facility-Administered Medications: None     Discharge Medication List as of 5/31/2025 11:36 AM        START taking these medications    Details   ibuprofen (MOTRIN) 600 mg tablet Take 1 tablet (600 mg total) by mouth every 6  (six) hours as needed for mild pain or moderate pain, Starting Sat 5/31/2025, Normal      methocarbamol (ROBAXIN) 500 mg tablet Take 1 tablet (500 mg total) by mouth 2 (two) times a day, Starting Sat 5/31/2025, Normal           CONTINUE these medications which have NOT CHANGED    Details   aspirin 81 mg chewable tablet Chew 1 tablet (81 mg total) daily Do not start before July 23, 2023., Starting Sun 7/23/2023, Normal      busPIRone (BUSPAR) 5 mg tablet Take 1 tablet (5 mg total) by mouth 2 (two) times a day, Starting Sat 7/22/2023, Normal      dextromethorphan-guaifenesin (MUCINEX DM)  MG per 12 hr tablet Take 1 tablet by mouth every 12 (twelve) hours, Starting Sun 2/16/2025, Normal      famotidine (PEPCID) 20 mg tablet Take 1 tablet (20 mg total) by mouth 2 (two) times a day, Starting Sun 2/16/2025, Normal      lisinopril (ZESTRIL) 10 mg tablet Take 1 tablet (10 mg total) by mouth daily, Starting Sat 7/22/2023, No Print      NIFEdipine (PROCARDIA XL) 30 mg 24 hr tablet Take 1 tablet (30 mg total) by mouth daily, Starting Sat 7/22/2023, No Print             ED SEPSIS DOCUMENTATION   Time reflects when diagnosis was documented in both MDM as applicable and the Disposition within this note       Time User Action Codes Description Comment    5/31/2025 11:34 AM Zachariah Dykes Add [M54.9] Back pain     5/31/2025 11:34 AM Zachariah Dykes Add [M54.50] Acute low back pain     5/31/2025 11:34 AM Zachariah Dykes Add [S39.012A] Strain of lumbar region, initial encounter                    [1] No past medical history on file.  [2]   Past Surgical History:  Procedure Laterality Date    TUBAL LIGATION     [3] No family history on file.  [4]   Social History  Tobacco Use    Smoking status: Never    Smokeless tobacco: Never   Substance Use Topics    Alcohol use: Never    Drug use: Never        Zachariah Dykes, DO  05/31/25 121

## 2025-06-02 ENCOUNTER — TELEPHONE (OUTPATIENT)
Dept: PHYSICAL THERAPY | Facility: OTHER | Age: 45
End: 2025-06-02

## 2025-06-02 NOTE — TELEPHONE ENCOUNTER
Call placed to the patient per [facility] referral.    Voice message left for patient to call back. Phone number and hours of business provided. I.S#[id number]    This is the 1st attempt to reach the patient.  Will defer per protocol.    NOTE: Pt requested PT referral through [facility] on her telemed visit from 5/6/25.

## 2025-06-13 NOTE — TELEPHONE ENCOUNTER
Call placed to the patient per [facility] referral.     VM left in Yoruba for patient to call back. Phone number and hours of business provided.      This is the 2nd attempt to reach the patient.  Will defer referral per protocol.     NOTE: Pt requested PT referral through [facility] on her telemed visit from 5/6/25.